# Patient Record
Sex: FEMALE | Race: WHITE | NOT HISPANIC OR LATINO | Employment: OTHER | URBAN - METROPOLITAN AREA
[De-identification: names, ages, dates, MRNs, and addresses within clinical notes are randomized per-mention and may not be internally consistent; named-entity substitution may affect disease eponyms.]

---

## 2018-01-31 ENCOUNTER — NEW PATIENT COMPREHENSIVE (OUTPATIENT)
Dept: URBAN - METROPOLITAN AREA CLINIC 43 | Facility: CLINIC | Age: 83
End: 2018-01-31

## 2018-01-31 DIAGNOSIS — H43.813: ICD-10-CM

## 2018-01-31 DIAGNOSIS — H04.123: ICD-10-CM

## 2018-01-31 DIAGNOSIS — H40.013: ICD-10-CM

## 2018-01-31 DIAGNOSIS — H25.13: ICD-10-CM

## 2018-01-31 PROCEDURE — 1036F TOBACCO NON-USER: CPT

## 2018-01-31 PROCEDURE — 92004 COMPRE OPH EXAM NEW PT 1/>: CPT

## 2018-01-31 PROCEDURE — G8427 DOCREV CUR MEDS BY ELIG CLIN: HCPCS

## 2018-01-31 PROCEDURE — 92015 DETERMINE REFRACTIVE STATE: CPT

## 2018-01-31 ASSESSMENT — TONOMETRY
OD_IOP_MMHG: 16
OS_IOP_MMHG: 17

## 2018-01-31 ASSESSMENT — VISUAL ACUITY
OS_CC: J1
OS_CC: 20/25-1
OS_SC: J1-
OD_BAT: 20/50
OD_CC: J1-
OS_SC: 20/50-2
OD_SC: 20/60
OS_BAT: 20/40
OD_SC: J2-
OD_CC: 20/30+2

## 2018-11-09 ENCOUNTER — EST. PATIENT EMERGENCY (OUTPATIENT)
Dept: URBAN - METROPOLITAN AREA CLINIC 47 | Facility: CLINIC | Age: 83
End: 2018-11-09

## 2018-11-09 DIAGNOSIS — H02.88B: ICD-10-CM

## 2018-11-09 DIAGNOSIS — H02.825: ICD-10-CM

## 2018-11-09 DIAGNOSIS — D23.122: ICD-10-CM

## 2018-11-09 PROCEDURE — 92012 INTRM OPH EXAM EST PATIENT: CPT

## 2018-11-09 PROCEDURE — G9903 PT SCRN TBCO ID AS NON USER: HCPCS

## 2018-11-09 PROCEDURE — 1036F TOBACCO NON-USER: CPT

## 2018-11-09 PROCEDURE — G8427 DOCREV CUR MEDS BY ELIG CLIN: HCPCS

## 2018-11-09 ASSESSMENT — VISUAL ACUITY
OS_CC: J1
OD_CC: 20/40-2
OS_CC: 20/30-2
OD_CC: J1

## 2018-11-09 ASSESSMENT — TONOMETRY: OS_IOP_MMHG: 16

## 2021-06-05 DIAGNOSIS — Z11.59 ENCOUNTER FOR SCREENING FOR OTHER VIRAL DISEASES: ICD-10-CM

## 2021-07-09 ENCOUNTER — TRANSFERRED RECORDS (OUTPATIENT)
Dept: HEALTH INFORMATION MANAGEMENT | Facility: CLINIC | Age: 86
End: 2021-07-09

## 2021-07-09 LAB
CREATININE (EXTERNAL): 1.35 MG/DL (ref 0.57–1.11)
GFR ESTIMATED (EXTERNAL): 37 ML/MIN/1.73M2
GFR ESTIMATED (IF AFRICAN AMERICAN) (EXTERNAL): 45 ML/MIN/1.73M2
GLUCOSE (EXTERNAL): 215 MG/DL (ref 65–100)
POTASSIUM (EXTERNAL): 4.4 MMOL/L (ref 3.5–5)

## 2021-07-12 RX ORDER — ROSUVASTATIN CALCIUM 10 MG/1
10 TABLET, COATED ORAL
COMMUNITY
Start: 2021-05-10

## 2021-07-12 RX ORDER — ALPRAZOLAM 0.25 MG
0.25 TABLET ORAL
COMMUNITY
Start: 2021-05-06

## 2021-07-12 RX ORDER — METRONIDAZOLE 500 MG/1
TABLET ORAL
Status: ON HOLD | COMMUNITY
Start: 2021-05-27 | End: 2021-07-15

## 2021-07-12 RX ORDER — ONDANSETRON 4 MG/1
4 TABLET, FILM COATED ORAL
Status: ON HOLD | COMMUNITY
Start: 2021-07-09 | End: 2021-07-15

## 2021-07-12 RX ORDER — ASPIRIN 325 MG
TABLET ORAL
COMMUNITY

## 2021-07-12 RX ORDER — HYDROCORTISONE 2.5 %
CREAM (GRAM) TOPICAL
Status: ON HOLD | COMMUNITY
Start: 2019-10-17 | End: 2021-07-14

## 2021-07-12 RX ORDER — GLIPIZIDE 5 MG/1
2.5 TABLET ORAL EVERY MORNING
COMMUNITY
Start: 2020-08-25

## 2021-07-12 RX ORDER — LATANOPROST 50 UG/ML
SOLUTION/ DROPS OPHTHALMIC
COMMUNITY
Start: 2021-06-22

## 2021-07-12 RX ORDER — LISINOPRIL 2.5 MG/1
2.5 TABLET ORAL
COMMUNITY
Start: 2021-06-03

## 2021-07-12 RX ORDER — TIMOLOL MALEATE 5 MG/ML
1 SOLUTION/ DROPS OPHTHALMIC 2 TIMES DAILY
COMMUNITY
Start: 2020-06-12

## 2021-07-12 RX ORDER — METFORMIN HCL 500 MG
1000 TABLET, EXTENDED RELEASE 24 HR ORAL 2 TIMES DAILY
COMMUNITY
Start: 2021-06-03

## 2021-07-12 RX ORDER — HYDROCHLOROTHIAZIDE 25 MG/1
12.5 TABLET ORAL DAILY
COMMUNITY
Start: 2021-06-03

## 2021-07-12 RX ORDER — NEOMYCIN SULFATE 500 MG/1
TABLET ORAL
Status: ON HOLD | COMMUNITY
Start: 2021-05-27 | End: 2021-07-15

## 2021-07-13 ENCOUNTER — HOSPITAL ENCOUNTER (OUTPATIENT)
Facility: CLINIC | Age: 86
Discharge: HOME | End: 2021-07-13
Attending: COLON & RECTAL SURGERY | Admitting: COLON & RECTAL SURGERY
Payer: MEDICARE

## 2021-07-13 VITALS
RESPIRATION RATE: 14 BRPM | HEART RATE: 54 BPM | TEMPERATURE: 96.7 F | OXYGEN SATURATION: 90 % | DIASTOLIC BLOOD PRESSURE: 52 MMHG | BODY MASS INDEX: 24.35 KG/M2 | WEIGHT: 116 LBS | HEIGHT: 58 IN | SYSTOLIC BLOOD PRESSURE: 112 MMHG

## 2021-07-13 LAB
COLONOSCOPY: NORMAL
GLUCOSE BLDC GLUCOMTR-MCNC: 155 MG/DL (ref 70–99)

## 2021-07-13 PROCEDURE — 99153 MOD SED SAME PHYS/QHP EA: CPT | Performed by: COLON & RECTAL SURGERY

## 2021-07-13 PROCEDURE — 45385 COLONOSCOPY W/LESION REMOVAL: CPT | Performed by: COLON & RECTAL SURGERY

## 2021-07-13 PROCEDURE — 250N000013 HC RX MED GY IP 250 OP 250 PS 637: Performed by: COLON & RECTAL SURGERY

## 2021-07-13 PROCEDURE — 88305 TISSUE EXAM BY PATHOLOGIST: CPT | Mod: TC | Performed by: COLON & RECTAL SURGERY

## 2021-07-13 PROCEDURE — 258N000003 HC RX IP 258 OP 636: Performed by: COLON & RECTAL SURGERY

## 2021-07-13 PROCEDURE — 45382 COLONOSCOPY W/CONTROL BLEED: CPT | Performed by: COLON & RECTAL SURGERY

## 2021-07-13 PROCEDURE — 45380 COLONOSCOPY AND BIOPSY: CPT | Mod: PT,XU | Performed by: COLON & RECTAL SURGERY

## 2021-07-13 PROCEDURE — 82962 GLUCOSE BLOOD TEST: CPT

## 2021-07-13 PROCEDURE — 250N000011 HC RX IP 250 OP 636: Performed by: COLON & RECTAL SURGERY

## 2021-07-13 PROCEDURE — G0500 MOD SEDAT ENDO SERVICE >5YRS: HCPCS | Performed by: COLON & RECTAL SURGERY

## 2021-07-13 RX ORDER — ONDANSETRON 2 MG/ML
4 INJECTION INTRAMUSCULAR; INTRAVENOUS
Status: DISCONTINUED | OUTPATIENT
Start: 2021-07-13 | End: 2021-07-13 | Stop reason: HOSPADM

## 2021-07-13 RX ORDER — ONDANSETRON 4 MG/1
4 TABLET, ORALLY DISINTEGRATING ORAL EVERY 6 HOURS PRN
Status: DISCONTINUED | OUTPATIENT
Start: 2021-07-13 | End: 2021-07-13 | Stop reason: HOSPADM

## 2021-07-13 RX ORDER — SODIUM CHLORIDE 9 MG/ML
INJECTION, SOLUTION INTRAVENOUS CONTINUOUS PRN
Status: COMPLETED | OUTPATIENT
Start: 2021-07-13 | End: 2021-07-13

## 2021-07-13 RX ORDER — PROCHLORPERAZINE MALEATE 5 MG
5 TABLET ORAL EVERY 6 HOURS PRN
Status: DISCONTINUED | OUTPATIENT
Start: 2021-07-13 | End: 2021-07-13 | Stop reason: HOSPADM

## 2021-07-13 RX ORDER — NALOXONE HYDROCHLORIDE 0.4 MG/ML
0.4 INJECTION, SOLUTION INTRAMUSCULAR; INTRAVENOUS; SUBCUTANEOUS
Status: DISCONTINUED | OUTPATIENT
Start: 2021-07-13 | End: 2021-07-13 | Stop reason: HOSPADM

## 2021-07-13 RX ORDER — SIMETHICONE 40MG/0.6ML
SUSPENSION, DROPS(FINAL DOSAGE FORM)(ML) ORAL PRN
Status: COMPLETED | OUTPATIENT
Start: 2021-07-13 | End: 2021-07-13

## 2021-07-13 RX ORDER — NALOXONE HYDROCHLORIDE 0.4 MG/ML
0.2 INJECTION, SOLUTION INTRAMUSCULAR; INTRAVENOUS; SUBCUTANEOUS
Status: DISCONTINUED | OUTPATIENT
Start: 2021-07-13 | End: 2021-07-13 | Stop reason: HOSPADM

## 2021-07-13 RX ORDER — LIDOCAINE 40 MG/G
CREAM TOPICAL
Status: DISCONTINUED | OUTPATIENT
Start: 2021-07-13 | End: 2021-07-13 | Stop reason: HOSPADM

## 2021-07-13 RX ORDER — ONDANSETRON 2 MG/ML
4 INJECTION INTRAMUSCULAR; INTRAVENOUS EVERY 6 HOURS PRN
Status: DISCONTINUED | OUTPATIENT
Start: 2021-07-13 | End: 2021-07-13 | Stop reason: HOSPADM

## 2021-07-13 RX ORDER — FLUMAZENIL 0.1 MG/ML
0.2 INJECTION, SOLUTION INTRAVENOUS
Status: DISCONTINUED | OUTPATIENT
Start: 2021-07-13 | End: 2021-07-13 | Stop reason: HOSPADM

## 2021-07-13 RX ORDER — FENTANYL CITRATE 50 UG/ML
INJECTION, SOLUTION INTRAMUSCULAR; INTRAVENOUS PRN
Status: COMPLETED | OUTPATIENT
Start: 2021-07-13 | End: 2021-07-13

## 2021-07-13 RX ADMIN — SODIUM CHLORIDE 125 ML/HR: 9 INJECTION, SOLUTION INTRAVENOUS at 11:17

## 2021-07-13 RX ADMIN — MIDAZOLAM 1 MG: 1 INJECTION INTRAMUSCULAR; INTRAVENOUS at 10:49

## 2021-07-13 RX ADMIN — Medication 133 MG: at 11:03

## 2021-07-13 RX ADMIN — FENTANYL CITRATE 25 MCG: 50 INJECTION, SOLUTION INTRAMUSCULAR; INTRAVENOUS at 11:01

## 2021-07-13 RX ADMIN — FENTANYL CITRATE 50 MCG: 50 INJECTION, SOLUTION INTRAMUSCULAR; INTRAVENOUS at 10:49

## 2021-07-13 ASSESSMENT — MIFFLIN-ST. JEOR
SCORE: 845.92
SCORE: 845.92

## 2021-07-13 NOTE — DISCHARGE INSTRUCTIONS
The patient has received a copy of the Provation  report the doctor has written and discharge instructions have been discussed with the patient and responsible adult.  All questions were addressed and answered prior to patient discharge.      Understanding Colon and Rectal Polyps     The colon has a smooth lining composed of millions of cells.     The colon (also called the large intestine) is a muscular tube that forms the last part of the digestive tract. It absorbs water and stores food waste. The colon is about 4 to 6 feet long. The rectum is the last 6 inches of the colon. The colon and rectum have a smooth lining composed of millions of cells. Changes in these cells can lead to growths in the colon that can become cancerous and should be removed.     When the Colon Lining Changes  Changes that occur in the cells that line the colon or rectum can lead to growths called polyps. Over a period of years, polyps can turn cancerous. Removing polyps early may prevent cancer from ever forming.      Polyps  Polyps are fleshy clumps of tissue that form on the lining of the colon or rectum. Small polyps are usually benign (not cancerous). However, over time, cells in a polyp can change and become cancerous. The larger a polyp grows, the more likely this is to happen. Also, certain types of polyps known as adenomatous polyps are considered premalignant. This means that they will almost always become cancerous if they re not removed.          Cancer  Almost all colorectal cancers start when polyp cells begin growing abnormally. As a cancerous tumor grows, it may involve more and more of the colon or rectum. In time, cancer can also grow beyond the colon or rectum and spread to nearby organs or to glands called lymph nodes. The cells can also travel to other parts of the body. This is known as metastasis. The earlier a cancerous tumor is removed, the better the chance of preventing its spread.        7514-8093 Wayne  [employer], [address] [place] [address]. All rights reserved. This information is not intended as a substitute for professional medical care. Always follow your healthcare professional's instructions.                                Understanding Diverticulosis and Diverticulitis     Pouches or diverticula usually occur in the lower part of the colon called the sigmoid.      Diverticulitis occurs when the pouches become inflamed.     The colon (large intestine) is the last part of the digestive tract. It absorbs water from stool and changes it from a liquid to a solid. In certain cases, small pouches called diverticula can form in the colon wall. This condition is called diverticulosis. The pouches can become infected. If this happens, it becomes a more serious problem called diverticulitis. These problems can be painful. But they can be managed.   Managing Your Condition  Diet changes or taking medications are often tried first. These may be enough to bring relief. If the case is bad, surgery may be done. You and your doctor can discuss the plan that is best for you.  If You Have Diverticulosis  Diet changes are often enough to control symptoms. The main changes are adding fiber (roughage) and drinking more water. Fiber absorbs water as it travels through your colon. This helps your stool stay soft and move smoothly. Water helps this process. If needed, you may be told to take over-the-counter stool softeners. To help relieve pain, antispasmodic medications may be prescribed.  If You Have Diverticulitis  Treatment depends on how bad your symptoms are.  For mild symptoms: You may be put on a liquid diet for a short time. You may also be prescribed antibiotics. If these two steps relieve your symptoms, you may then be prescribed a high-fiber diet. If you still have symptoms, your doctor will discuss further treatment options with you.  For severe symptoms: You may need to be admitted to the hospital.  There, you can be given IV antibiotics and fluids. Once symptoms are under control, the above treatments may be tried. If these don t control your condition, your doctor may discuss the option of having surgery with you.  [place] to Colon Health  Help keep your colon healthy with a diet that includes plenty of high-fiber fruits, vegetables, and whole grains. Drink plenty of liquids like water and juice. Your doctor may also recommend avoiding seeds and nuts.          4984-0609 [facility], [address] [place] [address], [place] [address]. All rights reserved. This information is not intended as a substitute for professional medical care. Always follow your healthcare professional's instructions.      Eating a High-Fiber Diet  Fiber is what gives strength and structure to plants. Most grains, beans, vegetables, and fruits contain fiber. Foods rich in fiber are often low in calories and fat, and they fill you up more. They may also reduce your risks for certain health problems. To find out the amount of fiber in canned, packaged, or frozen foods, read the  Nutrition Facts  label. It tells you how much fiber is in a serving.      Types of Fiber and Their Benefits  There are two types of fiber: insoluble and soluble. They both aid digestion and help you maintain a healthy weight.  Insoluble fiber: This is found in whole grains, cereals, certain fruits and vegetables (such as apple skin, corn, and carrots). Insoluble fiber may prevent constipation and reduce the risk of certain types of cancer.   Soluble fiber: This type of fiber is in oats, beans, and certain fruits and vegetables (such as strawberries and peas). Soluble fiber can reduce cholesterol (which may help lower the risk of heart disease), and helps control blood sugar levels.  Look for High-Fiber Foods  Whole-grain breads and cereals: Try to eat 6-8 ounces a day. Include wheat and oat bran cereals, whole-wheat muffins or toast, and corn tortillas in your  meals.  Fruits: Try to eat 2 cups a day. Apples, oranges, strawberries, pears, and bananas are good sources. (Note: Fruit juice is low in fiber.)  Vegetables: Try to eat 3 cups a day. Add asparagus, carrots, broccoli, peas, and corn to your meals.  Legumes (beans): One cup of cooked lentils gives you over 15 grams of fiber. Try navy beans, lentils, and chickpeas.  Seeds:  A small handful of seeds gives you about 3 grams of fiber. Try sunflower seeds.    Keep Track of Your Fiber  A healthy diet includes 31 grams of fiber a day if you have a 2,000-calorie diet. Keep track of how much fiber you eat. Start by reading food labels. Then eat a variety of foods high in fiber. Ask your doctor about supplemental fiber products.            1632-3026 Wayne Feldman[address], [place] [address]. All rights reserved. This information is not intended as a substitute for professional medical care. Always follow your healthcare professional's instructions.

## 2021-07-13 NOTE — H&P
"Pre-Endoscopy History and Physical     Zoe House MRN# [id number]   YOB: 1933 Age: 88 year old     Date of Procedure: 7/13/2021  Primary care provider: No Ref-Primary, Physician  Type of Endoscopy: Colonoscopy  Reason for Procedure: Screening, fecal incontinence  Type of Anesthesia Anticipated: Moderate Sedation    HPI:    Zoe is a 88 year old female who will be undergoing the above procedure.      A history and physical has been performed. The patient's medications and allergies have been reviewed. The risks and benefits of the procedure and the sedation options and risks were discussed with the patient.  All questions were answered and informed consent was obtained.      She denies a personal or family history of anesthesia complications or bleeding disorders.     Allergies   Allergen Reactions     Sulfa Drugs         No current facility-administered medications on file prior to encounter.  No current outpatient medications on file prior to encounter.      There is no problem list on file for this patient.       Past Medical History:   Diagnosis Date     Diabetes (H)      High cholesterol      Hypertension      Rectal prolapse         Past Surgical History:   Procedure Laterality Date     GYN SURGERY  2000    hysterectomy       Social History     Tobacco Use     Smoking status: Never Smoker     Smokeless tobacco: Never Used   Substance Use Topics     Alcohol use: Never       Family History   Problem Relation Age of Onset     Colon Cancer No family hx of        REVIEW OF SYSTEMS:     5 point ROS negative except as noted above in HPI, including Gen., Resp., CV, GI &  system review.      PHYSICAL EXAM:   Ht 1.473 m (4' 10\")   Wt 52.6 kg (116 lb)   BMI 24.24 kg/m   Estimated body mass index is 24.24 kg/m  as calculated from the following:    Height as of this encounter: 1.473 m (4' 10\").    Weight as of this encounter: 52.6 kg (116 lb).   GENERAL APPEARANCE: healthy and alert  MENTAL " STATUS: alert  AIRWAY EXAM: Mallampatti Class I (visualization of the soft palate, fauces, uvula, anterior and posterior pillars)  RESP: lungs clear to auscultation - no rales, rhonchi or wheezes  CV: regular rates and rhythm      IMPRESSION   ASA Class 3 - Severe systemic disease, but not incapacitating        PLAN:     Plan for colonoscopy. We discussed the risks, benefits and alternatives and the patient wished to proceed.    The above has been forwarded to the consulting provider.      Ashley Andino MD  [facility]  Phone: [phone]  Fax: [phone]  July 13, 2021

## 2021-07-14 ENCOUNTER — ANESTHESIA (OUTPATIENT)
Dept: SURGERY | Facility: CLINIC | Age: 86
DRG: 348 | End: 2021-07-14
Payer: MEDICARE

## 2021-07-14 ENCOUNTER — HOSPITAL ENCOUNTER (INPATIENT)
Facility: CLINIC | Age: 86
LOS: 2 days | Discharge: HOME | DRG: 348 | End: 2021-07-16
Attending: COLON & RECTAL SURGERY | Admitting: COLON & RECTAL SURGERY
Payer: MEDICARE

## 2021-07-14 ENCOUNTER — ANESTHESIA EVENT (OUTPATIENT)
Dept: SURGERY | Facility: CLINIC | Age: 86
DRG: 348 | End: 2021-07-14
Payer: MEDICARE

## 2021-07-14 PROBLEM — K62.3 RECTAL PROLAPSE: Status: ACTIVE | Noted: 2021-07-14

## 2021-07-14 LAB
FASTING STATUS PATIENT QL REPORTED: YES
GLUCOSE BLD-MCNC: 129 MG/DL (ref 70–99)
GLUCOSE BLDC GLUCOMTR-MCNC: 151 MG/DL (ref 70–99)
GLUCOSE BLDC GLUCOMTR-MCNC: 162 MG/DL (ref 70–99)
PATH REPORT.COMMENTS IMP SPEC: NORMAL
PATH REPORT.COMMENTS IMP SPEC: NORMAL
PATH REPORT.FINAL DX SPEC: NORMAL
PATH REPORT.GROSS SPEC: NORMAL
PATH REPORT.MICROSCOPIC SPEC OTHER STN: NORMAL
PHOTO IMAGE: NORMAL
PLATELET # BLD AUTO: 342 10E3/UL (ref 150–450)
POTASSIUM BLD-SCNC: 3.1 MMOL/L (ref 3.4–5.3)

## 2021-07-14 PROCEDURE — 250N000013 HC RX MED GY IP 250 OP 250 PS 637: Performed by: COLON & RECTAL SURGERY

## 2021-07-14 PROCEDURE — 250N000009 HC RX 250: Performed by: COLON & RECTAL SURGERY

## 2021-07-14 PROCEDURE — 250N000025 HC SEVOFLURANE, PER MIN: Performed by: COLON & RECTAL SURGERY

## 2021-07-14 PROCEDURE — 250N000011 HC RX IP 250 OP 636: Performed by: ANESTHESIOLOGY

## 2021-07-14 PROCEDURE — 99222 1ST HOSP IP/OBS MODERATE 55: CPT | Performed by: PHYSICIAN ASSISTANT

## 2021-07-14 PROCEDURE — 258N000003 HC RX IP 258 OP 636: Performed by: ANESTHESIOLOGY

## 2021-07-14 PROCEDURE — 250N000009 HC RX 250

## 2021-07-14 PROCEDURE — 250N000011 HC RX IP 250 OP 636: Performed by: COLON & RECTAL SURGERY

## 2021-07-14 PROCEDURE — 36415 COLL VENOUS BLD VENIPUNCTURE: CPT | Performed by: COLON & RECTAL SURGERY

## 2021-07-14 PROCEDURE — 93005 ELECTROCARDIOGRAM TRACING: CPT

## 2021-07-14 PROCEDURE — 272N000001 HC OR GENERAL SUPPLY STERILE: Performed by: COLON & RECTAL SURGERY

## 2021-07-14 PROCEDURE — 82947 ASSAY GLUCOSE BLOOD QUANT: CPT | Performed by: ANESTHESIOLOGY

## 2021-07-14 PROCEDURE — 88307 TISSUE EXAM BY PATHOLOGIST: CPT | Mod: TC | Performed by: COLON & RECTAL SURGERY

## 2021-07-14 PROCEDURE — 258N000003 HC RX IP 258 OP 636: Performed by: COLON & RECTAL SURGERY

## 2021-07-14 PROCEDURE — 250N000011 HC RX IP 250 OP 636

## 2021-07-14 PROCEDURE — 0DBP7ZZ EXCISION OF RECTUM, VIA NATURAL OR ARTIFICIAL OPENING: ICD-10-PCS | Performed by: COLON & RECTAL SURGERY

## 2021-07-14 PROCEDURE — 82962 GLUCOSE BLOOD TEST: CPT

## 2021-07-14 PROCEDURE — 84132 ASSAY OF SERUM POTASSIUM: CPT | Performed by: ANESTHESIOLOGY

## 2021-07-14 PROCEDURE — 36415 COLL VENOUS BLD VENIPUNCTURE: CPT | Performed by: ANESTHESIOLOGY

## 2021-07-14 PROCEDURE — 710N000009 HC RECOVERY PHASE 1, LEVEL 1, PER MIN: Performed by: COLON & RECTAL SURGERY

## 2021-07-14 PROCEDURE — 120N000001 HC R&B MED SURG/OB

## 2021-07-14 PROCEDURE — 99207 PR CONSULT E&M CHANGED TO INITIAL LEVEL: CPT | Performed by: PHYSICIAN ASSISTANT

## 2021-07-14 PROCEDURE — 0DBN7ZZ EXCISION OF SIGMOID COLON, VIA NATURAL OR ARTIFICIAL OPENING: ICD-10-PCS | Performed by: COLON & RECTAL SURGERY

## 2021-07-14 PROCEDURE — 360N000077 HC SURGERY LEVEL 4, PER MIN: Performed by: COLON & RECTAL SURGERY

## 2021-07-14 PROCEDURE — 999N000141 HC STATISTIC PRE-PROCEDURE NURSING ASSESSMENT: Performed by: COLON & RECTAL SURGERY

## 2021-07-14 PROCEDURE — 370N000017 HC ANESTHESIA TECHNICAL FEE, PER MIN: Performed by: COLON & RECTAL SURGERY

## 2021-07-14 PROCEDURE — 85049 AUTOMATED PLATELET COUNT: CPT | Performed by: COLON & RECTAL SURGERY

## 2021-07-14 PROCEDURE — 88305 TISSUE EXAM BY PATHOLOGIST: CPT | Mod: 26 | Performed by: PATHOLOGY

## 2021-07-14 RX ORDER — NALOXONE HYDROCHLORIDE 0.4 MG/ML
0.4 INJECTION, SOLUTION INTRAMUSCULAR; INTRAVENOUS; SUBCUTANEOUS
Status: DISCONTINUED | OUTPATIENT
Start: 2021-07-14 | End: 2021-07-16 | Stop reason: HOSPADM

## 2021-07-14 RX ORDER — HEPARIN SODIUM 5000 [USP'U]/.5ML
5000 INJECTION, SOLUTION INTRAVENOUS; SUBCUTANEOUS EVERY 8 HOURS
Status: DISCONTINUED | OUTPATIENT
Start: 2021-07-15 | End: 2021-07-16 | Stop reason: HOSPADM

## 2021-07-14 RX ORDER — ONDANSETRON 2 MG/ML
4 INJECTION INTRAMUSCULAR; INTRAVENOUS EVERY 30 MIN PRN
Status: DISCONTINUED | OUTPATIENT
Start: 2021-07-14 | End: 2021-07-14 | Stop reason: HOSPADM

## 2021-07-14 RX ORDER — HYDROMORPHONE HCL IN WATER/PF 6 MG/30 ML
.2-.4 PATIENT CONTROLLED ANALGESIA SYRINGE INTRAVENOUS EVERY 5 MIN PRN
Status: DISCONTINUED | OUTPATIENT
Start: 2021-07-14 | End: 2021-07-14 | Stop reason: HOSPADM

## 2021-07-14 RX ORDER — HYDROMORPHONE HCL IN WATER/PF 6 MG/30 ML
0.4 PATIENT CONTROLLED ANALGESIA SYRINGE INTRAVENOUS
Status: DISCONTINUED | OUTPATIENT
Start: 2021-07-14 | End: 2021-07-16 | Stop reason: HOSPADM

## 2021-07-14 RX ORDER — FENTANYL CITRATE 50 UG/ML
50 INJECTION, SOLUTION INTRAMUSCULAR; INTRAVENOUS EVERY 5 MIN PRN
Status: DISCONTINUED | OUTPATIENT
Start: 2021-07-14 | End: 2021-07-14 | Stop reason: HOSPADM

## 2021-07-14 RX ORDER — CEFADROXIL 500 MG/1
500 CAPSULE ORAL 2 TIMES DAILY
Status: DISCONTINUED | OUTPATIENT
Start: 2021-07-14 | End: 2021-07-14

## 2021-07-14 RX ORDER — SODIUM CHLORIDE, SODIUM LACTATE, POTASSIUM CHLORIDE, CALCIUM CHLORIDE 600; 310; 30; 20 MG/100ML; MG/100ML; MG/100ML; MG/100ML
INJECTION, SOLUTION INTRAVENOUS CONTINUOUS
Status: DISCONTINUED | OUTPATIENT
Start: 2021-07-14 | End: 2021-07-14 | Stop reason: HOSPADM

## 2021-07-14 RX ORDER — DEXAMETHASONE SODIUM PHOSPHATE 4 MG/ML
INJECTION, SOLUTION INTRA-ARTICULAR; INTRALESIONAL; INTRAMUSCULAR; INTRAVENOUS; SOFT TISSUE PRN
Status: DISCONTINUED | OUTPATIENT
Start: 2021-07-14 | End: 2021-07-14

## 2021-07-14 RX ORDER — CEFAZOLIN SODIUM 2 G/100ML
2 INJECTION, SOLUTION INTRAVENOUS SEE ADMIN INSTRUCTIONS
Status: DISCONTINUED | OUTPATIENT
Start: 2021-07-14 | End: 2021-07-14 | Stop reason: HOSPADM

## 2021-07-14 RX ORDER — LIDOCAINE HYDROCHLORIDE 20 MG/ML
INJECTION, SOLUTION INFILTRATION; PERINEURAL PRN
Status: DISCONTINUED | OUTPATIENT
Start: 2021-07-14 | End: 2021-07-14

## 2021-07-14 RX ORDER — NICOTINE POLACRILEX 4 MG
15-30 LOZENGE BUCCAL
Status: DISCONTINUED | OUTPATIENT
Start: 2021-07-14 | End: 2021-07-16 | Stop reason: HOSPADM

## 2021-07-14 RX ORDER — CEFAZOLIN SODIUM 1 G/3ML
1 INJECTION, POWDER, FOR SOLUTION INTRAMUSCULAR; INTRAVENOUS EVERY 8 HOURS
Status: COMPLETED | OUTPATIENT
Start: 2021-07-14 | End: 2021-07-15

## 2021-07-14 RX ORDER — POTASSIUM CHLORIDE 7.45 MG/ML
10 INJECTION INTRAVENOUS ONCE
Status: COMPLETED | OUTPATIENT
Start: 2021-07-14 | End: 2021-07-14

## 2021-07-14 RX ORDER — DEXTROSE MONOHYDRATE 25 G/50ML
25-50 INJECTION, SOLUTION INTRAVENOUS
Status: DISCONTINUED | OUTPATIENT
Start: 2021-07-14 | End: 2021-07-16 | Stop reason: HOSPADM

## 2021-07-14 RX ORDER — ONDANSETRON 2 MG/ML
4 INJECTION INTRAMUSCULAR; INTRAVENOUS ONCE
Status: COMPLETED | OUTPATIENT
Start: 2021-07-14 | End: 2021-07-14

## 2021-07-14 RX ORDER — LIDOCAINE 40 MG/G
CREAM TOPICAL
Status: DISCONTINUED | OUTPATIENT
Start: 2021-07-14 | End: 2021-07-16 | Stop reason: HOSPADM

## 2021-07-14 RX ORDER — ONDANSETRON 2 MG/ML
INJECTION INTRAMUSCULAR; INTRAVENOUS PRN
Status: DISCONTINUED | OUTPATIENT
Start: 2021-07-14 | End: 2021-07-14

## 2021-07-14 RX ORDER — CEFADROXIL 500 MG/1
500 CAPSULE ORAL 2 TIMES DAILY
Status: COMPLETED | OUTPATIENT
Start: 2021-07-14 | End: 2021-07-16

## 2021-07-14 RX ORDER — PROPOFOL 10 MG/ML
INJECTION, EMULSION INTRAVENOUS PRN
Status: DISCONTINUED | OUTPATIENT
Start: 2021-07-14 | End: 2021-07-14

## 2021-07-14 RX ORDER — LABETALOL HYDROCHLORIDE 5 MG/ML
10 INJECTION, SOLUTION INTRAVENOUS EVERY 5 MIN PRN
Status: DISCONTINUED | OUTPATIENT
Start: 2021-07-14 | End: 2021-07-14 | Stop reason: HOSPADM

## 2021-07-14 RX ORDER — NALOXONE HYDROCHLORIDE 0.4 MG/ML
0.2 INJECTION, SOLUTION INTRAMUSCULAR; INTRAVENOUS; SUBCUTANEOUS
Status: DISCONTINUED | OUTPATIENT
Start: 2021-07-14 | End: 2021-07-16 | Stop reason: HOSPADM

## 2021-07-14 RX ORDER — ACETAMINOPHEN 325 MG/1
650 TABLET ORAL EVERY 4 HOURS PRN
Status: DISCONTINUED | OUTPATIENT
Start: 2021-07-17 | End: 2021-07-16 | Stop reason: HOSPADM

## 2021-07-14 RX ORDER — ACETAMINOPHEN 325 MG/1
975 TABLET ORAL EVERY 8 HOURS
Status: DISCONTINUED | OUTPATIENT
Start: 2021-07-14 | End: 2021-07-16 | Stop reason: HOSPADM

## 2021-07-14 RX ORDER — OXYCODONE HYDROCHLORIDE 5 MG/1
5 TABLET ORAL EVERY 4 HOURS PRN
Status: DISCONTINUED | OUTPATIENT
Start: 2021-07-14 | End: 2021-07-16 | Stop reason: HOSPADM

## 2021-07-14 RX ORDER — LISINOPRIL 2.5 MG/1
2.5 TABLET ORAL DAILY
Status: DISCONTINUED | OUTPATIENT
Start: 2021-07-15 | End: 2021-07-16 | Stop reason: HOSPADM

## 2021-07-14 RX ORDER — ONDANSETRON 4 MG/1
4 TABLET, ORALLY DISINTEGRATING ORAL EVERY 6 HOURS PRN
Status: DISCONTINUED | OUTPATIENT
Start: 2021-07-14 | End: 2021-07-16 | Stop reason: HOSPADM

## 2021-07-14 RX ORDER — HYDRALAZINE HYDROCHLORIDE 20 MG/ML
2.5-5 INJECTION INTRAMUSCULAR; INTRAVENOUS
Status: DISCONTINUED | OUTPATIENT
Start: 2021-07-14 | End: 2021-07-14 | Stop reason: HOSPADM

## 2021-07-14 RX ORDER — HYDRALAZINE HYDROCHLORIDE 20 MG/ML
10 INJECTION INTRAMUSCULAR; INTRAVENOUS EVERY 4 HOURS PRN
Status: DISCONTINUED | OUTPATIENT
Start: 2021-07-14 | End: 2021-07-16 | Stop reason: HOSPADM

## 2021-07-14 RX ORDER — NEOSTIGMINE METHYLSULFATE 1 MG/ML
VIAL (ML) INJECTION PRN
Status: DISCONTINUED | OUTPATIENT
Start: 2021-07-14 | End: 2021-07-14

## 2021-07-14 RX ORDER — CEFAZOLIN SODIUM 2 G/100ML
2 INJECTION, SOLUTION INTRAVENOUS
Status: COMPLETED | OUTPATIENT
Start: 2021-07-14 | End: 2021-07-14

## 2021-07-14 RX ORDER — BUPIVACAINE HYDROCHLORIDE AND EPINEPHRINE 5; 5 MG/ML; UG/ML
INJECTION, SOLUTION EPIDURAL; INTRACAUDAL; PERINEURAL
Status: DISCONTINUED
Start: 2021-07-14 | End: 2021-07-14 | Stop reason: HOSPADM

## 2021-07-14 RX ORDER — HEPARIN SODIUM 5000 [USP'U]/.5ML
5000 INJECTION, SOLUTION INTRAVENOUS; SUBCUTANEOUS
Status: COMPLETED | OUTPATIENT
Start: 2021-07-14 | End: 2021-07-14

## 2021-07-14 RX ORDER — DIPHENHYDRAMINE HYDROCHLORIDE 50 MG/ML
12.5 INJECTION INTRAMUSCULAR; INTRAVENOUS EVERY 6 HOURS PRN
Status: DISCONTINUED | OUTPATIENT
Start: 2021-07-14 | End: 2021-07-16 | Stop reason: HOSPADM

## 2021-07-14 RX ORDER — BUPIVACAINE HYDROCHLORIDE AND EPINEPHRINE 5; 5 MG/ML; UG/ML
INJECTION, SOLUTION PERINEURAL PRN
Status: DISCONTINUED | OUTPATIENT
Start: 2021-07-14 | End: 2021-07-14 | Stop reason: HOSPADM

## 2021-07-14 RX ORDER — EPHEDRINE SULFATE 50 MG/ML
INJECTION, SOLUTION INTRAMUSCULAR; INTRAVENOUS; SUBCUTANEOUS PRN
Status: DISCONTINUED | OUTPATIENT
Start: 2021-07-14 | End: 2021-07-14

## 2021-07-14 RX ORDER — PROPOFOL 10 MG/ML
INJECTION, EMULSION INTRAVENOUS CONTINUOUS PRN
Status: DISCONTINUED | OUTPATIENT
Start: 2021-07-14 | End: 2021-07-14

## 2021-07-14 RX ORDER — ONDANSETRON 4 MG/1
4 TABLET, ORALLY DISINTEGRATING ORAL EVERY 30 MIN PRN
Status: DISCONTINUED | OUTPATIENT
Start: 2021-07-14 | End: 2021-07-14 | Stop reason: HOSPADM

## 2021-07-14 RX ORDER — SODIUM CHLORIDE, SODIUM LACTATE, POTASSIUM CHLORIDE, CALCIUM CHLORIDE 600; 310; 30; 20 MG/100ML; MG/100ML; MG/100ML; MG/100ML
INJECTION, SOLUTION INTRAVENOUS CONTINUOUS
Status: DISCONTINUED | OUTPATIENT
Start: 2021-07-14 | End: 2021-07-15

## 2021-07-14 RX ORDER — ONDANSETRON 2 MG/ML
4 INJECTION INTRAMUSCULAR; INTRAVENOUS EVERY 6 HOURS PRN
Status: DISCONTINUED | OUTPATIENT
Start: 2021-07-14 | End: 2021-07-16 | Stop reason: HOSPADM

## 2021-07-14 RX ORDER — HYDROMORPHONE HCL IN WATER/PF 6 MG/30 ML
0.2 PATIENT CONTROLLED ANALGESIA SYRINGE INTRAVENOUS
Status: DISCONTINUED | OUTPATIENT
Start: 2021-07-14 | End: 2021-07-16 | Stop reason: HOSPADM

## 2021-07-14 RX ORDER — ACETAMINOPHEN 325 MG/1
975 TABLET ORAL ONCE
Status: COMPLETED | OUTPATIENT
Start: 2021-07-14 | End: 2021-07-14

## 2021-07-14 RX ORDER — GLYCOPYRROLATE 0.2 MG/ML
INJECTION, SOLUTION INTRAMUSCULAR; INTRAVENOUS PRN
Status: DISCONTINUED | OUTPATIENT
Start: 2021-07-14 | End: 2021-07-14

## 2021-07-14 RX ORDER — OXYCODONE HYDROCHLORIDE 5 MG/1
10 TABLET ORAL EVERY 4 HOURS PRN
Status: DISCONTINUED | OUTPATIENT
Start: 2021-07-14 | End: 2021-07-16 | Stop reason: HOSPADM

## 2021-07-14 RX ORDER — ROSUVASTATIN CALCIUM 10 MG/1
10 TABLET, COATED ORAL DAILY
Status: DISCONTINUED | OUTPATIENT
Start: 2021-07-14 | End: 2021-07-16 | Stop reason: HOSPADM

## 2021-07-14 RX ORDER — LATANOPROST 50 UG/ML
1 SOLUTION/ DROPS OPHTHALMIC EVERY EVENING
Status: DISCONTINUED | OUTPATIENT
Start: 2021-07-15 | End: 2021-07-16 | Stop reason: HOSPADM

## 2021-07-14 RX ORDER — FENTANYL CITRATE 50 UG/ML
INJECTION, SOLUTION INTRAMUSCULAR; INTRAVENOUS PRN
Status: DISCONTINUED | OUTPATIENT
Start: 2021-07-14 | End: 2021-07-14

## 2021-07-14 RX ORDER — TIMOLOL MALEATE 5 MG/ML
1 SOLUTION/ DROPS OPHTHALMIC DAILY
Status: DISCONTINUED | OUTPATIENT
Start: 2021-07-15 | End: 2021-07-15

## 2021-07-14 RX ORDER — HYDROCHLOROTHIAZIDE 12.5 MG/1
12.5 TABLET ORAL DAILY
Status: DISCONTINUED | OUTPATIENT
Start: 2021-07-15 | End: 2021-07-16 | Stop reason: HOSPADM

## 2021-07-14 RX ADMIN — FENTANYL CITRATE 25 MCG: 50 INJECTION, SOLUTION INTRAMUSCULAR; INTRAVENOUS at 09:52

## 2021-07-14 RX ADMIN — DEXAMETHASONE SODIUM PHOSPHATE 4 MG: 4 INJECTION, SOLUTION INTRA-ARTICULAR; INTRALESIONAL; INTRAMUSCULAR; INTRAVENOUS; SOFT TISSUE at 08:15

## 2021-07-14 RX ADMIN — Medication 5 MG: at 07:36

## 2021-07-14 RX ADMIN — ROCURONIUM BROMIDE 20 MG: 10 INJECTION INTRAVENOUS at 08:27

## 2021-07-14 RX ADMIN — POTASSIUM CHLORIDE 10 MEQ: 7.46 INJECTION, SOLUTION INTRAVENOUS at 07:04

## 2021-07-14 RX ADMIN — CEFADROXIL 500 MG: 500 CAPSULE ORAL at 21:22

## 2021-07-14 RX ADMIN — FENTANYL CITRATE 25 MCG: 50 INJECTION, SOLUTION INTRAMUSCULAR; INTRAVENOUS at 07:34

## 2021-07-14 RX ADMIN — GLYCOPYRROLATE 0.8 MG: 0.2 INJECTION, SOLUTION INTRAMUSCULAR; INTRAVENOUS at 09:54

## 2021-07-14 RX ADMIN — ACETAMINOPHEN 975 MG: 325 TABLET, FILM COATED ORAL at 22:06

## 2021-07-14 RX ADMIN — PROPOFOL 100 MG: 10 INJECTION, EMULSION INTRAVENOUS at 07:35

## 2021-07-14 RX ADMIN — ACETAMINOPHEN 975 MG: 325 TABLET, FILM COATED ORAL at 06:22

## 2021-07-14 RX ADMIN — GLYCOPYRROLATE 0.2 MG: 0.2 INJECTION, SOLUTION INTRAMUSCULAR; INTRAVENOUS at 07:29

## 2021-07-14 RX ADMIN — NEOSTIGMINE METHYLSULFATE 3 MG: 1 INJECTION, SOLUTION INTRAVENOUS at 09:54

## 2021-07-14 RX ADMIN — HEPARIN SODIUM 5000 UNITS: 10000 INJECTION, SOLUTION INTRAVENOUS; SUBCUTANEOUS at 08:10

## 2021-07-14 RX ADMIN — ROSUVASTATIN CALCIUM 10 MG: 10 TABLET, FILM COATED ORAL at 20:19

## 2021-07-14 RX ADMIN — PROPOFOL 40 MCG/KG/MIN: 10 INJECTION, EMULSION INTRAVENOUS at 07:42

## 2021-07-14 RX ADMIN — SODIUM CHLORIDE, POTASSIUM CHLORIDE, SODIUM LACTATE AND CALCIUM CHLORIDE: 600; 310; 30; 20 INJECTION, SOLUTION INTRAVENOUS at 16:15

## 2021-07-14 RX ADMIN — ROCURONIUM BROMIDE 30 MG: 10 INJECTION INTRAVENOUS at 07:35

## 2021-07-14 RX ADMIN — SODIUM CHLORIDE, POTASSIUM CHLORIDE, SODIUM LACTATE AND CALCIUM CHLORIDE: 600; 310; 30; 20 INJECTION, SOLUTION INTRAVENOUS at 06:31

## 2021-07-14 RX ADMIN — GLYCOPYRROLATE 0.2 MG: 0.2 INJECTION, SOLUTION INTRAMUSCULAR; INTRAVENOUS at 07:39

## 2021-07-14 RX ADMIN — FENTANYL CITRATE 25 MCG: 50 INJECTION, SOLUTION INTRAMUSCULAR; INTRAVENOUS at 09:09

## 2021-07-14 RX ADMIN — METRONIDAZOLE 500 MG: 500 INJECTION, SOLUTION INTRAVENOUS at 18:59

## 2021-07-14 RX ADMIN — METRONIDAZOLE 500 MG: 500 INJECTION, SOLUTION INTRAVENOUS at 07:58

## 2021-07-14 RX ADMIN — SODIUM CHLORIDE, POTASSIUM CHLORIDE, SODIUM LACTATE AND CALCIUM CHLORIDE: 600; 310; 30; 20 INJECTION, SOLUTION INTRAVENOUS at 09:52

## 2021-07-14 RX ADMIN — CEFAZOLIN 1 G: 1 INJECTION, POWDER, FOR SOLUTION INTRAMUSCULAR; INTRAVENOUS at 23:18

## 2021-07-14 RX ADMIN — ONDANSETRON 4 MG: 2 INJECTION INTRAMUSCULAR; INTRAVENOUS at 10:02

## 2021-07-14 RX ADMIN — SODIUM CHLORIDE, POTASSIUM CHLORIDE, SODIUM LACTATE AND CALCIUM CHLORIDE: 600; 310; 30; 20 INJECTION, SOLUTION INTRAVENOUS at 23:17

## 2021-07-14 RX ADMIN — Medication 5 MG: at 08:40

## 2021-07-14 RX ADMIN — CEFAZOLIN 1 G: 1 INJECTION, POWDER, FOR SOLUTION INTRAMUSCULAR; INTRAVENOUS at 16:16

## 2021-07-14 RX ADMIN — LIDOCAINE HYDROCHLORIDE 60 MG: 20 INJECTION, SOLUTION INFILTRATION; PERINEURAL at 07:34

## 2021-07-14 RX ADMIN — ACETAMINOPHEN 975 MG: 325 TABLET, FILM COATED ORAL at 16:16

## 2021-07-14 RX ADMIN — ONDANSETRON 4 MG: 2 INJECTION INTRAMUSCULAR; INTRAVENOUS at 07:21

## 2021-07-14 RX ADMIN — Medication 5 MG: at 08:10

## 2021-07-14 RX ADMIN — CEFAZOLIN SODIUM 2 G: 2 INJECTION, SOLUTION INTRAVENOUS at 07:45

## 2021-07-14 RX ADMIN — PROPOFOL 40 MG: 10 INJECTION, EMULSION INTRAVENOUS at 09:53

## 2021-07-14 ASSESSMENT — ACTIVITIES OF DAILY LIVING (ADL)
ADLS_ACUITY_SCORE: 18
ADLS_ACUITY_SCORE: 19

## 2021-07-14 ASSESSMENT — ENCOUNTER SYMPTOMS
SEIZURES: 0
DYSRHYTHMIAS: 0

## 2021-07-14 ASSESSMENT — LIFESTYLE VARIABLES: TOBACCO_USE: 0

## 2021-07-14 ASSESSMENT — MIFFLIN-ST. JEOR: SCORE: 854.99

## 2021-07-14 NOTE — ANESTHESIA CARE TRANSFER NOTE
Patient: Zoe House    Procedure(s):  RECTAL PROLAPSE REPAIR, PERINEAL RECTOSIGMOIDECTOMY    Diagnosis: Rectal prolapse [K62.3]  Diagnosis Additional Information: No value filed.    Anesthesia Type:   General     Note:    Oropharynx: oropharynx clear of all foreign objects and spontaneously breathing  Level of Consciousness: drowsy  Oxygen Supplementation: face mask  Level of Supplemental Oxygen (L/min / FiO2): 6  Independent Airway: airway patency satisfactory and stable  Dentition: dentition unchanged  Vital Signs Stable: post-procedure vital signs reviewed and stable  Report to RN Given: handoff report given  Patient transferred to: PACU  Comments: Neuromuscular blockade reversed after TOF 4/4, spontaneous respirations, adequate tidal volumes, followed commands to voice, oropharynx suctioned with soft flexible catheter, extubated atraumatically, extubated with suction, airway patent after extubation.  Oxygen via facemask at 6 liters per minute to PACU. Oxygen tubing connected to wall O2 in PACU, SpO2, NiBP, and EKG monitors and alarms on and functioning, Simona Hugger warmer connected to patient gown, report on patient's clinical status given to PACU RN, RN questions answered.     Handoff Report: Identifed the Patient, Identified the Reponsible Provider, Reviewed the pertinent medical history, Discussed the surgical course, Reviewed Intra-OP anesthesia mangement and issues during anesthesia, Set expectations for post-procedure period and Allowed opportunity for questions and acknowledgement of understanding      Vitals: (Last set prior to Anesthesia Care Transfer)  CRNA VITALS  7/14/2021 0952 - 7/14/2021 1029      7/14/2021             NIBP:  138/56    NIBP Mean:  95        Electronically Signed By: LUIZ Pelaez CRNA  July 14, 2021  10:29 AM

## 2021-07-14 NOTE — OP NOTE
OPERATIVE REPORT    PREOPERATIVE DIAGNOSIS:  Full thickness rectal prolapse.     POSTOPERATIVE DIAGNOSIS:  Same     OPERATION PERFORMED:  Perineal rectosigmoidectomy.     SURGEON:  Ashley Andino M.D.    ASSISTANT:    ALEXUS Manzo MD ([facility] Colorectal Surgery Fellow)    ANESTHESIA:  General.     INDICATIONS:  The patient is a 88 year old female with full thickness rectal prolapse.  She has [facility] and th prolapse protrudes approximately 5 cm and has always been reducible.  She has difficulty with anal hygiene due to the prolapse and has been incontinent to solid and liquid stool.  Her incontinence has worsened.  On physical examination, she was found to have full-thickness rectal prolapse.  She will now undergo a perineal rectosigmoidectomy for rectal prolapse .     OPERATIVE FINDINGS:  The patient's rectum was redundant and could easily be prolapsed out of the anus for 5 cm.  The anal sphincter was widely patulous with minimal tone.  There was some tissue posteriorly and anteriorly to buttress the puborectalis and perineal body respectively.  Approximately 18 inches of rectum and redundant sigmoid were removed.  The patient's sigmoid colon was redundant with a large sigmoid loop.     PROCEDURE IN DETAIL:  After informed consent was obtained, the patient was brought to the operating room where general anesthesia was induced without difficulty.  She was flipped into the well-padded prone jackknife position with the buttocks taped apart.  A Thompson catheter was inserted.  Her perineum and vagina were sterilely prepped and draped in usual fashion.     The rectum was prolapsed out of the anus using multiple Allis clamps for a length of 5 to 6 cm.  A Lone Star retractor was inserted with sharp hooks in the perianal skin just outside the anoderm.    Circumferential dissection was then continued to the outside of the anorectal tube.  The peritoneal  cul-de-sac was easily identified and  from the rectum anteriorly.  Careful attention was made not to enter the vagina.  The cul-de-sac was then entered by incising the peritoneum using Ligasure device.  The mesorectum was then retracted superiorly.  The mesorectum was then taken down and divided using the small hand-held LigaSure device working proximally.  A fairly large redundant sigmoid loop was present proximal to the rectum.  The mesorectum and sigmoid colonic mesentery were taken down using the Ligasure device straightening out a large sigmoid loop.  Approximately 33 cm of rectum and sigmoid colon were delivered through the anorectal ring.  The segments of mesorectum and sigmoid colonic mesentery were inspected for hemostasis, which was meticulous.     The Lone Star hooks were replaced into the anoderm cuff in preparation for the anastomosis.  The area of demarcation on the sigmoid colon was clearly identified, and the colon was divided proximally at this point in an area where the colon was pink and viable with excellent blood supply.  The colon was divided slowly placing 4-quadrant 2-0 vicryl sutures into the rectum as it was divided.  The specimen measured 33 cm in length and was removed from the operative field and submitted for pathological analysis.     The anastomosis was then completed using many interrupted 3-0 Vicryl sutures using the 2-0 vicryl 4-quadrant sutures as a guide.  The anastomosis was tension-free with excellent blood supply.  Hemostasis was meticulous.  The Lone Star retractor was removed allowing the anastomosis to retract into the anal canal.  30 mL of 0.5% Marcaine with epinephrine was injected into the perianal area for post-operative analgesia.  A sterile gauze dressing was applied.    The patient tolerated the procedure well without complications.  Estimated blood loss was 5 mL.  I was present and scrubbed for the entire duration of the operation.  The patient was returned  to the supine position and extubated in the operating room.  She was brought to the recovery room in stable condition.    Ashley Andino MD

## 2021-07-14 NOTE — CONSULTS
[facility]  Consult Note - Hospitalist Service     Date of Admission:  7/14/2021  Consult Requested by: QUENTIN  Reason for Consult: medical management    Assessment & Plan   Zoe House is a 88 year old female with PMHx of HTN, hyperlipidemia, T2DM, chronic right shoulder pain and weakness with right shoulder cuff syndrome who was admitted by the colorectal surgery service on 7/14/21 for underwent perineal rectosigmoidectomy for full thickness rectal prolapse. Hospitalist service consulted for medical co-management.      Full thickness rectal prolapse s/p perineal rectosigmoidectomy (7/14/21)  Surgery per Dr. Andino. GETA. Minimal EBL.   - Defer routine post-operative cares, IVF, abx, DVT prophylaxis and pain control to primary service   - Encourage pulmonary toilet; incentive spirometer at bedside, pt demonstrated use during consultation  - Bowel regimen in place while on narcotics   - CBC, BMP, lytes in AM     Hypertension  Hyperlipidemia  PTA regimen hydrochlorothiazide 12.5 mg daily, lisinopril 2.5 mg daily, rosuvastatin 10 mg daily  - Resume PTA regimen on POD #1 with hold parameters  - PRN hydralazine for sBP > 180    Bradycardia with PACs  Appears chronically 60s per notes and patient. No blocks evident. No further workup.   - Maintain electrolytes    Mild hypokalemia   3.1 preoperatively, repleted.  - Lyte repletion protocol    Non-Insulin dependent type 2 diabetes  PTA Regimen: Glipizide 2.5 mg daily, Metformin 1000 mg twice daily  Last HbA1c 8.4% 8/2020  Received 4mg dexamethasone intraoperatively.  - Hypoglycemia protocol  - Preprandial and HS fingerstick checks or Q 4 hours while NPO  - Hold PTA oral antiglycemics during hospitalization, can resume on discharge  - Sliding scale insulin Medium, may need to increase given steroid dose intraoperatively  - Recheck HbA1c in AM    Recent Labs   Lab 07/14/21  1039 07/14/21  0559 07/13/21  1046   * 129* 155*       Recent UTI  UA  completed preperatively showing E coli >100k on culture.  - Started on cefadroxil by PCP, to complete course 7/15. Pt has bottle with her here and not on formulary. D/w pharmacist, will order so patient can take during admission to complete her course.    Chronic kidney disease, stage 3  Baseline Cr 1.35 range preoperatively. Check in AM  - Avoid nephrotoxins - contrast, NSAIDs  - Renally dose medications as able/needed  - Monitor    Glaucoma: Continue PTA eye drops    PAD s/p carotid artery angioplasty (1/2020)     The patient's care was discussed with the Attending Physician, Dr. Esposito, Bedside Nurse, Patient, Patient's Family and Pharmacist.    Oma Ventura PA-C  [facility]  Securely message with the Vocera Web Console (learn more here)  Text page via [employer] Paging/Directory    ______________________________________________________________________    Chief Complaint   Rectal prolapse    History is obtained from the patient and electronic health record    History of Present Illness   Zoe Huose is a 88 year old female's medical history significant for essential hypertension, hyperlipidemia, non-insulin-dependent type 2 diabetes, right rotator cuff syndrome, internal hemorrhoids who is admitted on 7/14/2021 by the colorectal surgery service for planned perineal rectosigmoidectomy due to rectal prolapse. GETA. EBL <10mL. No drains placed. No immediate postoperative complications. Preop H&P reviewed.  EKG reportedly unchanged from prior, image unavailable. Finishing course of cefadroxil 500mg BID from PCP for E coli UTI, has bottle with her. Not on [facility] formulary.    During my visit, patient hemodynamically stable. Feeling well and without pain. No current c/o. HRs 50s and 60s without heart blocks. HR appears chronically on the low side. No C/o, confirmed code status with family at bedside.     Review of Systems   The 10 point Review of Systems is negative other than noted  in the [facility] or here.     Past Medical History    I have reviewed this patient's medical history and updated it with pertinent information if needed.   Past Medical History:   Diagnosis Date     Chronic right shoulder pain      Diabetes (H)      High cholesterol      Hypertension      Rectal prolapse      Rotator cuff syndrome, right    Trigger finger  right index finger    Past Surgical History   I have reviewed this patient's surgical history and updated it with pertinent information if needed.  Past Surgical History:   Procedure Laterality Date     BREAST SURGERY      breast lumpectomy, benign on left     COLONOSCOPY N/A 7/13/2021    Procedure: Colonoscopy, With Polypectomy And Biopsy;  Surgeon: Coco Andino MD;  Location:  GI     ENT SURGERY      tonsil and adenoidectomy      GENITOURINARY SURGERY      bladder suspension     GYN SURGERY  2000    hysterectomy     VASCULAR SURGERY      carotid angioplasty left       Social History   I have reviewed this patient's social history and updated it with pertinent information if needed.  Social History     Tobacco Use     Smoking status: Never Smoker     Smokeless tobacco: Never Used   Vaping Use     Vaping Use: Never used   Substance Use Topics     Alcohol use: Never     Drug use: Never       Family History   I have reviewed this patient's family history and updated it with pertinent information if needed.  Family History   Problem Relation Age of Onset     Cerebrovascular Disease Mother      Cerebrovascular Disease Brother      Colon Cancer No family hx of        Medications   I have reviewed this patient's current medications  Medications Prior to Admission   Medication Sig Dispense Refill Last Dose     ALPRAZolam (XANAX) 0.25 MG tablet Take 0.25 mg by mouth   7/12/2021 at PRN     aspirin (ASA) 325 MG tablet    7/12/2021 at AM     glipiZIDE (GLUCOTROL) 5 MG tablet Take 2.5 mg by mouth every morning (0.5 X 5 mg)   7/12/2021 at AM     hydrochlorothiazide  (HYDRODIURIL) 25 MG tablet Take 12.5 mg by mouth daily (0.5 X 25 mg)   7/12/2021 at AM     latanoprost (XALATAN) 0.005 % ophthalmic solution    7/14/2021 at AM     lisinopril (ZESTRIL) 2.5 MG tablet Take 2.5 mg by mouth   7/12/2021 at AM     metFORMIN (GLUCOPHAGE-XR) 500 MG 24 hr tablet Take 1,000 mg by mouth 2 times daily (2 X 500 mg)   7/12/2021 at PM     ondansetron (ZOFRAN) 4 MG tablet Take 4 mg by mouth    at PRN     rosuvastatin (CRESTOR) 10 MG tablet Take 10 mg by mouth   7/12/2021 at PM     timolol maleate (TIMOPTIC) 0.5 % ophthalmic solution Apply 1 drop to eye   7/14/2021 at AM     metroNIDAZOLE (FLAGYL) 500 MG tablet TAKE 1 TABLET AT 1 PM 2 PM AND 11 PM THE DAY BEFORE SURGERY   7/13/2021 at 2300     neomycin (MYCIFRADIN) 500 MG tablet TAKE 2 TABLETS AT 1 PM 2 PM AND 11 PM THE DAY BEFORE SURGERY   7/13/2021 at 2300       Allergies   Allergies   Allergen Reactions     Melatonin Other (See Comments)     fainted  L sided numbness of whole body     Sulfa Drugs Hives       Physical Exam   Vital Signs: Temp: 97.7  F (36.5  C) Temp src: Axillary BP: 126/48 Pulse: 63   Resp: 14 SpO2: 96 % O2 Device: None (Room air) Oxygen Delivery: 2 LPM  Weight: 118 lbs 0 oz    General: Awake, alert, elderly woman who appears stated age. Looks comfortable sitting up in bed. No acute distress.  HEENT: Normocephalic, atraumatic. Extraocular movements intact.   Respiratory: Clear to auscultation bilaterally, no rales, wheezing, or rhonchi.  Cardiovascular: Regular rate and rhythm, +S1 and S2, no murmur auscultated. No peripheral edema. PCDs in place.   Gastrointestinal: Soft, non-tender, non-distended. Bowel sounds present. Thompson in place.  Skin: Warm, dry. No obvious rashes or lesions on exposed skin. Dorsalis pedis pulses palpable bilaterally.  Musculoskeletal: No joint swelling, erythema or tenderness. Moves all extremities equally.  Neurologic: AAO x3. Cranial nerves 2-12 grossly intact, normal strength and  sensation.  Psychiatric: Appropriate mood and affect. No obvious anxiety or depression.    Data   Results for orders placed or performed during the hospital encounter of 07/14/21 (from the past 24 hour(s))   Potassium   Result Value Ref Range    Potassium 3.1 (L) 3.4 - 5.3 mmol/L   Glucose   Result Value Ref Range    Glucose 129 (H) 70 - 99 mg/dL    Patient Fasting > 8hrs? Yes    EKG 12 lead   Result Value Ref Range    Systolic Blood Pressure  mmHg    Diastolic Blood Pressure  mmHg    Ventricular Rate 52 BPM    Atrial Rate 52 BPM    CA Interval 168 ms    QRS Duration 128 ms     ms    QTc 485 ms    P Axis 47 degrees    R AXIS -21 degrees    T Axis 57 degrees    Interpretation ECG Click View Image link to view waveform and result    Glucose by meter   Result Value Ref Range    GLUCOSE BY METER POCT 162 (H) 70 - 99 mg/dL

## 2021-07-14 NOTE — ANESTHESIA PREPROCEDURE EVALUATION
Anesthesia Pre-Procedure Evaluation    Patient: Zoe House   MRN: [id number] : 1933        Preoperative Diagnosis: Rectal prolapse [K62.3]   Procedure : Procedure(s):  RECTAL PROLAPSE REPAIR, PERINEAL RECTOSIGMOIDECTOMY     Past Medical History:   Diagnosis Date     Chronic right shoulder pain      Diabetes (H)      High cholesterol      Hypertension      Rectal prolapse      Rotator cuff syndrome, right       Past Surgical History:   Procedure Laterality Date     BREAST SURGERY      breast lumpectomy, benign on left     COLONOSCOPY N/A 2021    Procedure: Colonoscopy, With Polypectomy And Biopsy;  Surgeon: Coco Andino MD;  Location:  GI     ENT SURGERY      tonsil and adenoidectomy      GENITOURINARY SURGERY      bladder suspension     GYN SURGERY      hysterectomy     VASCULAR SURGERY      carotid angioplasty left      Allergies   Allergen Reactions     Melatonin      Sulfa Drugs Hives      Social History     Tobacco Use     Smoking status: Never Smoker     Smokeless tobacco: Never Used   Substance Use Topics     Alcohol use: Never      Wt Readings from Last 1 Encounters:   21 53.5 kg (118 lb)        Anesthesia Evaluation   Pt has had prior anesthetic. Type: General.    History of anesthetic complications   slow to wake.    ROS/MED HX  ENT/Pulmonary:    (-) tobacco use, asthma and sleep apnea   Neurologic:     (+) no peripheral neuropathy  (-) no seizures and no CVA   Cardiovascular:     (+) Dyslipidemia hypertension----- (-) CAD and arrhythmias   METS/Exercise Tolerance: 3 - Able to walk 1-2 blocks without stopping    Hematologic:       Musculoskeletal: Comment: R shoulder pain      GI/Hepatic: Comment: Rectal prolapse   (-) GERD   Renal/Genitourinary: Comment: Treated for recent UTI   (-) renal disease   Endo:     (+) type II DM, Not using insulin,  (-) thyroid disease   Psychiatric/Substance Use:       Infectious Disease:    (-) Recent Fever   Malignancy:       Other:             Physical Exam    Airway  airway exam normal      Mallampati: II   TM distance: > 3 FB   Neck ROM: full   Mouth opening: > 3 cm    Respiratory Devices and Support         Dental  no notable dental history         Cardiovascular   cardiovascular exam normal          Pulmonary   pulmonary exam normal                OUTSIDE LABS:  CBC: No results found for: WBC, HGB, HCT, PLT  BMP:   Lab Results   Component Value Date    POTASSIUM 3.1 (L) 07/14/2021     (H) 07/14/2021     (H) 07/13/2021     COAGS: No results found for: PTT, INR, FIBR  POC: No results found for: BGM, HCG, HCGS  HEPATIC: No results found for: ALBUMIN, PROTTOTAL, ALT, AST, GGT, ALKPHOS, BILITOTAL, BILIDIRECT, EMMANUEL  OTHER: No results found for: PH, LACT, A1C, JOSÉ ANTONIO, PHOS, MAG, LIPASE, AMYLASE, TSH, T4, T3, CRP, SED    Anesthesia Plan    ASA Status:  2   NPO Status:  NPO Appropriate    Anesthesia Type: General.     - Airway: ETT   Induction: Intravenous.   Maintenance: Balanced.   Techniques and Equipment:     - Airway: Video-Laryngoscope         Consents    Anesthesia Plan(s) and associated risks, benefits, and realistic alternatives discussed. Questions answered and patient/representative(s) expressed understanding.     - Discussed with:  Patient         Postoperative Care    Pain management: IV analgesics, Oral pain medications.   PONV prophylaxis: Ondansetron (or other 5HT-3), Dexamethasone or Solumedrol, Background Propofol Infusion     Comments:    No versed, minimize narcotics as able.    K 3.1 - 10mEQ IV in preop            Sebastien Alan MD

## 2021-07-14 NOTE — BRIEF OP NOTE
[facility]    Brief Operative Note    Pre-operative diagnosis: Rectal prolapse [K62.3]  Post-operative diagnosis Same as pre-operative diagnosis    Procedure: Procedure(s):  RECTAL PROLAPSE REPAIR, PERINEAL RECTOSIGMOIDECTOMY  Surgeon: Surgeon(s) and Role:     * Coco Andino MD - Primary     * Joanna Brown MD - Assisting  Anesthesia: General   Estimated blood loss: 10  Drains: None  Specimens:   ID Type Source Tests Collected by Time Destination   1 : rectum and sigmoid colon Tissue Rectum SURGICAL PATHOLOGY EXAM Coco Andino MD 7/14/2021  8:53 AM      Findings:   redundant rectum and sigmoid colon.  Complications: None.  Implants: * No implants in log *      Condition on discharge from OR: Satisfactory    Sen Moore MD   [facility]   [phone].        ADDENDUM:    PATIENT DATA  Indicate Y or N:  Home O2 No  Hemodialysis  No  Transplant patient  No  Cirrhosis  No  Steroids in last 30 days  No  Immunomodulators in last 30 days  No  Anticoagulation at time of surgery  No   List medication   Prior abdominal surgery  No  Pelvic irradiation  No    Albumin within 30 days if known    Hgb within 30 days if known  No results found for: HGB]  Cr within 30 days if known  No results found for: CR]  Body mass index is 24.66 kg/m .      OR DATA  Emergent  No   <24 hours  No   <1 week  No  Bowel Prep Yes  Antibiotics  Yes  DVT prophylaxis    Heparin  Yes   SCD  Yes   None  No  Drain  No  ASA (1,2,3,4) 2  OR time (min) 115  Stents  No  Transfuse >/= 2U  No  Anastomosis   Stapled  No   Handsewn  Yes  Leak Test    Positive  No   Negative  No   Not done  Yes

## 2021-07-14 NOTE — ANESTHESIA PROCEDURE NOTES
Airway         Procedure Start/Stop Times: 7/14/2021 7:40 AM  Staff -        Anesthesiologist:  Sebastien Alan MD       CRNA: Mary Loja APRN CRNA       Other Anesthesia Staff: Mihir Minor       Performed By: TEODORO  Consent for Airway        Urgency: elective  Indications and Patient Condition       Indications for airway management: shirley-procedural       Induction type:intravenous       Mask difficulty assessment: 1 - vent by mask    Final Airway Details       Final airway type: endotracheal airway       Successful airway: ETT - single  Endotracheal Airway Details        ETT size (mm): 7.0       Cuffed: yes       Successful intubation technique: video laryngoscopy       VL Blade Size: Buenrostro 3       Grade View of Cords: 2 (2b)       Adjucts: stylet       Position: Right       Measured from: lips       Secured at (cm): 21       Bite block used: None    Post intubation assessment        Placement verified by: capnometry, equal breath sounds and chest rise        Number of attempts at approach: 1       Ease of procedure: easy       Dentition: Intact and Unchanged    Additional Comments       Criocid pressure improved view to grade 2a

## 2021-07-14 NOTE — PROGRESS NOTES
"Called into room by family as patient was feeling \"faint.\" from the pain from the potassium infusion. Potassium stopped  and then patient then became unresponsive.  Dr. Sparks immediately at bedside and Dr. Alan was called to bedside.  O2 placed, Hr down to upper 20's (at the lowest), BP 80's/50's.  Atropine given by Dr. Alan, ephedrine and Robinul given by SCRNA.  EKG done and VS stabilized.      Family at bedside and kept informed.    "

## 2021-07-14 NOTE — PROGRESS NOTES
PTA medications completed by Medication Scribe day of surgery     Medication history sources: Patient and H&P  In the past week, patient estimated taking medication this percent of the time: Greater than 90%  Adherence assessment: N/A Not Observed    Significant changes made to the medication list:  Patient reports no longer taking the following meds (med scribe removed from PTA med list): Hydrocortisone 2.5% Cream      Additional medication history information:   Patient brought own home meds: Latanoprost, Timoptic    Medication reconciliation completed by provider prior to medication history? No    Time spent in this activity: 25 minutes    The information provided in this note is only as accurate as the sources available at the time of update(s)      Prior to Admission medications    Medication Sig Last Dose Taking? Auth Provider   ALPRAZolam (XANAX) 0.25 MG tablet Take 0.25 mg by mouth 7/12/2021 at PRN Yes Reported, Patient   aspirin (ASA) 325 MG tablet  7/12/2021 at AM Yes Reported, Patient   glipiZIDE (GLUCOTROL) 5 MG tablet Take 2.5 mg by mouth every morning (0.5 X 5 mg) 7/12/2021 at AM Yes Reported, Patient   hydrochlorothiazide (HYDRODIURIL) 25 MG tablet Take 12.5 mg by mouth daily (0.5 X 25 mg) 7/12/2021 at AM Yes Reported, Patient   latanoprost (XALATAN) 0.005 % ophthalmic solution  7/14/2021 at AM Yes Reported, Patient   lisinopril (ZESTRIL) 2.5 MG tablet Take 2.5 mg by mouth 7/12/2021 at AM Yes Reported, Patient   metFORMIN (GLUCOPHAGE-XR) 500 MG 24 hr tablet Take 1,000 mg by mouth 2 times daily (2 X 500 mg) 7/12/2021 at PM Yes Reported, Patient   ondansetron (ZOFRAN) 4 MG tablet Take 4 mg by mouth  at PRN Yes Reported, Patient   rosuvastatin (CRESTOR) 10 MG tablet Take 10 mg by mouth 7/12/2021 at PM Yes Reported, Patient   timolol maleate (TIMOPTIC) 0.5 % ophthalmic solution Apply 1 drop to eye 7/14/2021 at AM Yes Reported, Patient   metroNIDAZOLE (FLAGYL) 500 MG tablet TAKE 1 TABLET AT 1 PM 2 PM AND  11 PM THE DAY BEFORE SURGERY 7/13/2021 at 2300  Reported, Patient   neomycin (MYCIFRADIN) 500 MG tablet TAKE 2 TABLETS AT 1 PM 2 PM AND 11 PM THE DAY BEFORE SURGERY 7/13/2021 at 2300  Reported, Patient       Jorge Alberto Ring, [facility]  [facility] [employer]

## 2021-07-14 NOTE — ANESTHESIA POSTPROCEDURE EVALUATION
Patient: Zoe House    Procedure(s):  RECTAL PROLAPSE REPAIR, PERINEAL RECTOSIGMOIDECTOMY    Diagnosis:Rectal prolapse [K62.3]  Diagnosis Additional Information: No value filed.    Anesthesia Type:  General    Note:  Disposition: Admission   Postop Pain Control: Uneventful            Sign Out: Well controlled pain   PONV: No   Neuro/Psych: Uneventful            Sign Out: Acceptable/Baseline neuro status   Airway/Respiratory: Uneventful            Sign Out: Acceptable/Baseline resp. status; O2 supplementation               Oxygen: Nasal Cannula   CV/Hemodynamics: Uneventful            Sign Out: Acceptable CV status   Other NRE: NONE   DID A NON-ROUTINE EVENT OCCUR? No           Last vitals:  Vitals:    07/14/21 1050 07/14/21 1100 07/14/21 1110   BP: (!) 146/59 137/53    Pulse: 53 53 52   Resp: 15 16 18   Temp:   36.1  C (96.9  F)   SpO2: 99% 98% 98%       Last vitals prior to Anesthesia Care Transfer:  CRNA VITALS  7/14/2021 0952 - 7/14/2021 1052      7/14/2021             NIBP:  138/56    NIBP Mean:  95          Electronically Signed By: Sebastien Alan MD  July 14, 2021  11:17 AM

## 2021-07-15 LAB
ANION GAP SERPL CALCULATED.3IONS-SCNC: 12 MMOL/L (ref 3–14)
BUN SERPL-MCNC: 16 MG/DL (ref 7–30)
CALCIUM SERPL-MCNC: 8.6 MG/DL (ref 8.5–10.1)
CHLORIDE BLD-SCNC: 105 MMOL/L (ref 94–109)
CO2 SERPL-SCNC: 15 MMOL/L (ref 20–32)
CREAT SERPL-MCNC: 1.06 MG/DL (ref 0.52–1.04)
GFR SERPL CREATININE-BSD FRML MDRD: 47 ML/MIN/1.73M2
GLUCOSE BLD-MCNC: 116 MG/DL (ref 70–99)
GLUCOSE BLDC GLUCOMTR-MCNC: 149 MG/DL (ref 70–99)
GLUCOSE BLDC GLUCOMTR-MCNC: 184 MG/DL (ref 70–99)
GLUCOSE BLDC GLUCOMTR-MCNC: 225 MG/DL (ref 70–99)
GLUCOSE BLDC GLUCOMTR-MCNC: 245 MG/DL (ref 70–99)
HBA1C MFR BLD: 6.3 % (ref 0–5.6)
MAGNESIUM SERPL-MCNC: 1.9 MG/DL (ref 1.6–2.3)
PATH REPORT.COMMENTS IMP SPEC: NORMAL
PATH REPORT.COMMENTS IMP SPEC: NORMAL
PATH REPORT.FINAL DX SPEC: NORMAL
PATH REPORT.GROSS SPEC: NORMAL
PATH REPORT.MICROSCOPIC SPEC OTHER STN: NORMAL
PATH REPORT.RELEVANT HX SPEC: NORMAL
PHOSPHATE SERPL-MCNC: 3.4 MG/DL (ref 2.5–4.5)
PHOTO IMAGE: NORMAL
POTASSIUM BLD-SCNC: 4 MMOL/L (ref 3.4–5.3)
SODIUM SERPL-SCNC: 132 MMOL/L (ref 133–144)

## 2021-07-15 PROCEDURE — 250N000013 HC RX MED GY IP 250 OP 250 PS 637: Performed by: PHYSICIAN ASSISTANT

## 2021-07-15 PROCEDURE — 83735 ASSAY OF MAGNESIUM: CPT | Performed by: COLON & RECTAL SURGERY

## 2021-07-15 PROCEDURE — 99232 SBSQ HOSP IP/OBS MODERATE 35: CPT | Performed by: INTERNAL MEDICINE

## 2021-07-15 PROCEDURE — 250N000013 HC RX MED GY IP 250 OP 250 PS 637: Performed by: COLON & RECTAL SURGERY

## 2021-07-15 PROCEDURE — 36415 COLL VENOUS BLD VENIPUNCTURE: CPT | Performed by: COLON & RECTAL SURGERY

## 2021-07-15 PROCEDURE — 88307 TISSUE EXAM BY PATHOLOGIST: CPT | Mod: 26 | Performed by: PATHOLOGY

## 2021-07-15 PROCEDURE — 83036 HEMOGLOBIN GLYCOSYLATED A1C: CPT | Performed by: PHYSICIAN ASSISTANT

## 2021-07-15 PROCEDURE — 250N000012 HC RX MED GY IP 250 OP 636 PS 637: Performed by: PHYSICIAN ASSISTANT

## 2021-07-15 PROCEDURE — 80048 BASIC METABOLIC PNL TOTAL CA: CPT | Performed by: COLON & RECTAL SURGERY

## 2021-07-15 PROCEDURE — 84100 ASSAY OF PHOSPHORUS: CPT | Performed by: COLON & RECTAL SURGERY

## 2021-07-15 PROCEDURE — 250N000011 HC RX IP 250 OP 636: Performed by: COLON & RECTAL SURGERY

## 2021-07-15 PROCEDURE — 120N000001 HC R&B MED SURG/OB

## 2021-07-15 RX ORDER — TIMOLOL MALEATE 5 MG/ML
1 SOLUTION/ DROPS OPHTHALMIC 2 TIMES DAILY
Status: DISCONTINUED | OUTPATIENT
Start: 2021-07-15 | End: 2021-07-16 | Stop reason: HOSPADM

## 2021-07-15 RX ADMIN — INSULIN ASPART 1 UNITS: 100 INJECTION, SOLUTION INTRAVENOUS; SUBCUTANEOUS at 17:24

## 2021-07-15 RX ADMIN — HYDROCHLOROTHIAZIDE 12.5 MG: 12.5 TABLET ORAL at 08:13

## 2021-07-15 RX ADMIN — CEFADROXIL 500 MG: 500 CAPSULE ORAL at 08:14

## 2021-07-15 RX ADMIN — ACETAMINOPHEN 975 MG: 325 TABLET, FILM COATED ORAL at 21:59

## 2021-07-15 RX ADMIN — ROSUVASTATIN CALCIUM 10 MG: 10 TABLET, FILM COATED ORAL at 20:16

## 2021-07-15 RX ADMIN — LISINOPRIL 2.5 MG: 2.5 TABLET ORAL at 08:12

## 2021-07-15 RX ADMIN — TIMOLOL MALEATE 1 DROP: 5 SOLUTION/ DROPS OPHTHALMIC at 20:16

## 2021-07-15 RX ADMIN — HEPARIN SODIUM 5000 UNITS: 5000 INJECTION, SOLUTION INTRAVENOUS; SUBCUTANEOUS at 22:00

## 2021-07-15 RX ADMIN — CEFADROXIL 500 MG: 500 CAPSULE ORAL at 20:16

## 2021-07-15 RX ADMIN — HEPARIN SODIUM 5000 UNITS: 5000 INJECTION, SOLUTION INTRAVENOUS; SUBCUTANEOUS at 14:06

## 2021-07-15 RX ADMIN — METRONIDAZOLE 500 MG: 500 INJECTION, SOLUTION INTRAVENOUS at 08:12

## 2021-07-15 RX ADMIN — INSULIN ASPART 1 UNITS: 100 INJECTION, SOLUTION INTRAVENOUS; SUBCUTANEOUS at 08:15

## 2021-07-15 RX ADMIN — TIMOLOL MALEATE 1 DROP: 5 SOLUTION/ DROPS OPHTHALMIC at 08:14

## 2021-07-15 RX ADMIN — CEFAZOLIN 1 G: 1 INJECTION, POWDER, FOR SOLUTION INTRAMUSCULAR; INTRAVENOUS at 06:33

## 2021-07-15 RX ADMIN — INSULIN ASPART 2 UNITS: 100 INJECTION, SOLUTION INTRAVENOUS; SUBCUTANEOUS at 11:49

## 2021-07-15 RX ADMIN — LATANOPROST 1 DROP: 50 SOLUTION/ DROPS OPHTHALMIC at 20:15

## 2021-07-15 RX ADMIN — HEPARIN SODIUM 5000 UNITS: 5000 INJECTION, SOLUTION INTRAVENOUS; SUBCUTANEOUS at 06:33

## 2021-07-15 RX ADMIN — ACETAMINOPHEN 975 MG: 325 TABLET, FILM COATED ORAL at 06:33

## 2021-07-15 RX ADMIN — ACETAMINOPHEN 975 MG: 325 TABLET, FILM COATED ORAL at 14:06

## 2021-07-15 ASSESSMENT — ACTIVITIES OF DAILY LIVING (ADL)
ADLS_ACUITY_SCORE: 20

## 2021-07-15 NOTE — PROGRESS NOTES
[facility] [place]    HOSPITALIST PROGRESS NOTE :   --------------------------------------------------    Date of Admission:  7/14/2021    Cumulative Summary: Zoe House is a 88 year old female with PMHx of HTN, hyperlipidemia, T2DM, chronic right shoulder pain and weakness with right shoulder cuff syndrome who was admitted by the colorectal surgery service on 7/14/21 for underwent perineal rectosigmoidectomy for full thickness rectal prolapse. Hospitalist service consulted for medical co-management.      Assessment & Plan     Full thickness rectal prolapse s/p perineal rectosigmoidectomy (7/14/21)  Surgery per Dr. Andino. TEO. Minimal EBL.     -- Patient consult care was assumed this morning , seen and examined, feeling better , not sure if she is ready to go home today, she lives with her  who is blind and would not be able to help her at home   -- Defer routine post-operative cares, IVF, abx, DVT prophylaxis and pain control to primary service   -- Encourage pulmonary toilet; incentive spirometer at bedside, pt demonstrated use during consultation  -- Bowel regimen in place while on narcotics   -- Agree with removing the saldivar , will discontinue IV fluids as she is starting to eat and drink      Hypertension  Hyperlipidemia  PTA regimen hydrochlorothiazide 12.5 mg daily, lisinopril 2.5 mg daily, rosuvastatin 10 mg daily  -- Resume PTA regimen from today , patient has already received hydrochlorothiazide this morning which might be contributing to her hyponatremia , will recommend PCP to consider changing hydrochlorothiazide to a different antihypertensive med   -- PRN hydralazine for sBP > 180     Bradycardia with PACs  Appears chronically 60s per notes and patient. No blocks evident. No further workup.   -- Maintain electrolytes, potassium is improved      Mild hypokalemia   3.1 preoperatively, repleted.  -- Lyte repletion protocol     Non-Insulin dependent type 2 diabetes  PTA  Regimen: Glipizide 2.5 mg daily, Metformin 1000 mg twice daily  Last HbA1c 8.4% 8/2020  Received 4mg dexamethasone intraoperatively.  -- Hypoglycemia protocol  -- Will change Accu checks to QAC and HS  -- Hold PTA oral antiglycemics during hospitalization, can resume on discharge  -- Sliding scale insulin Medium, may need to increase given steroid dose intraoperatively  -- HbA1c came back 6.3    Recent UTI  UA completed preperatively showing E coli >100k on culture.  -- Started on cefadroxil by PCP, to complete course 7/15. Pt has bottle with her here and not on formulary. D/w pharmacist, will order so patient can take during admission to complete her course.     Chronic kidney disease, stage 3  Baseline Cr 1.35 range preoperatively. Check in AM  -- Avoid nephrotoxins - contrast, NSAIDs  -- Renally dose medications as able/needed  -- Monitor     Glaucoma:   --Continue PTA eye drops     PAD s/p carotid artery angioplasty (1/2020)    Diet: Low Fiber Diet    Thompson Catheter: PRESENT, indication: /GI/GYN Pelvic Procedure  DVT Prophylaxis: Pneumatic Compression Devices  Code Status: Full Code    The patient's care was discussed with the Patient.    Disposition Plan   Expected discharge: 07/17/2021 , patient is expected to be discharged to home once ready , final disposition per surgery team , patient is stable medically and can be discharged on home med's when ready. Hospital medicine will sign off at this pointplease don't hesitate to contact/ re consult if needed    Taylor Esposito MD, FACP  Text Page (7am - 6pm)    ----------------------------------------------------------------------------------------------------------------------    Interval History   Patient Care was assumed this morning , seen and examined  Lying in bed, starting to feel better , did have breakfast this morning , denying nausea or vomiting   She is denying any chest pain, palpitations or SOB, discussed with her regarding her current medications ,  she will be finishing the course of antibiotics for recent UTI     -Data reviewed today: I reviewed all new labs and imaging results over the last 24 hours.    I personally reviewed no images or EKG's today.    Physical Exam   Temp: 98.7  F (37.1  C) Temp src: Oral BP: 125/49 Pulse: 79   Resp: 21 SpO2: 98 % O2 Device: None (Room air) Oxygen Delivery: 2 LPM  Vitals:    07/14/21 0550   Weight: 53.5 kg (118 lb)     Vital Signs with Ranges  Temp:  [96.3  F (35.7  C)-98.7  F (37.1  C)] 98.7  F (37.1  C)  Pulse:  [52-79] 79  Resp:  [8-21] 21  BP: (112-152)/(45-63) 125/49  SpO2:  [93 %-100 %] 98 %  I/O last 3 completed shifts:  In: 1320 [P.O.:120; I.V.:1200]  Out: 960 [Urine:960]    GENERAL: Alert , awake and oriented. NAD. Conversational, appropriate.   HEENT: Normocephalic. EOMI. No icterus or injection. Nares normal.   LUNGS: Clear to auscultation. No dyspnea at rest.   HEART: Regular rate. Extremities perfused.   ABDOMEN: Soft, nontender, and nondistended. Positive bowel sounds.   EXTREMITIES: No LE edema noted.   NEUROLOGIC: Moves extremities x4 on command. No acute focal neurologic abnormalities noted.     Medications     lactated ringers 75 mL/hr at 07/14/21 2317       acetaminophen  975 mg Oral Q8H     cefadroxil  500 mg Oral BID     heparin ANTICOAGULANT  5,000 Units Subcutaneous Q8H     hydrochlorothiazide  12.5 mg Oral Daily     insulin aspart  1-7 Units Subcutaneous TID AC     insulin aspart  1-5 Units Subcutaneous At Bedtime     latanoprost  1 drop Both Eyes QPM     lisinopril  2.5 mg Oral Daily     rosuvastatin  10 mg Oral Daily     sodium chloride (PF)  3 mL Intracatheter Q8H     timolol maleate  1 drop Ophthalmic Daily       Data   Recent Labs   Lab 07/15/21  0806 07/15/21  0648 07/14/21  2155 07/14/21  1548 07/14/21  0559   PLT  --   --   --  342  --    NA  --  132*  --   --   --    POTASSIUM  --  4.0  --   --  3.1*   CHLORIDE  --  105  --   --   --    CO2  --  15*  --   --   --    BUN  --  16  --   --    --    CR  --  1.06*  --   --   --    ANIONGAP  --  12  --   --   --    JOSÉ ANTONIO  --  8.6  --   --   --    * 116* 151*  --  129*       Imaging:   No results found for this or any previous visit (from the past 24 hour(s)).

## 2021-07-15 NOTE — PLAN OF CARE
7/15/21 [phone] POD #1 Rectal Prolapse  A&Ox4. SBA with gait belt, up to bathroom. VSS on RA. Tele NST occ PVCs. Denies pain, mild ache to rectum. Scheduled Tylenol.  Low Fiber diet, tolerating well. Continent, removed saldivar this morning and voiding. Stated to have had felt a small BM, passing flatus. K 4.0. , 225, 184. PIV SL, intermittent abx for treatment of UTI - Cefepime & Flagyl. Skin CDI, dressing to rectum. CMS and pulses present. Using IS & not needing ice to bottom. Discharge pending to home.

## 2021-07-15 NOTE — PROGRESS NOTES
COLON & RECTAL SURGERY  PROGRESS NOTE    July 15, 2021  Post-op Day #1     SUBJECTIVE:  Doing well. Has not been OOB yet. Tolerating FLD. No n/v. +flatus, -BM.     OBJECTIVE:  Temp:  [96.3  F (35.7  C)-98.7  F (37.1  C)] 98.7  F (37.1  C)  Pulse:  [52-79] 79  Resp:  [8-21] 21  BP: (112-152)/(45-63) 125/49  SpO2:  [93 %-100 %] 98 %    Intake/Output Summary (Last 24 hours) at 7/15/2021 0920  Last data filed at 7/15/2021 0600  Gross per 24 hour   Intake 1320 ml   Output 810 ml   Net 510 ml       GENERAL:  Awake, alert, no acute distress  HEAD: Normocephalic atraumatic  SCLERA: Anicteric  EXTREMITIES: Warm and well perfused  ABDOMEN:  Soft, appropriately tender, non-distended. No guarding, rigidity, or peritoneal signs.     LABS:  No results found for: WBC  No results found for: HGB  No results found for: HCT  Lab Results   Component Value Date     07/14/2021     Last Basic Metabolic Panel:  Lab Results   Component Value Date     07/15/2021      Lab Results   Component Value Date    POTASSIUM 4.0 07/15/2021     Lab Results   Component Value Date    CHLORIDE 105 07/15/2021     Lab Results   Component Value Date    JOSÉ ANTONIO 8.6 07/15/2021     Lab Results   Component Value Date    CO2 15 07/15/2021     Lab Results   Component Value Date    BUN 16 07/15/2021     Lab Results   Component Value Date    CR 1.06 07/15/2021     Lab Results   Component Value Date     07/15/2021     07/15/2021       ASSESSMENT/PLAN: 89 yo F with history of full thickness rectal prolapse, now s/p perineal rectosigmoidectomy. AVSS.    1. Low fiber diet  2. PRN pain meds  3. Decrease IVF  4. Remove Thompson  5. OOB, ambulate  6. Appreciate hospitalist recommendation for abx for UTI  7. Heparin for ppx    Discussed with Dr. Andino.     For questions/paging, please contact the CRS office at [phone].    Opal Barger PA-C ([place])  Colorectal Physician Assistant    [facility]  [address]  [place]  [place] [address]  T: [phone]  F: [phone]      [facility] Staff  I performed a history and physical examination of the patient and discussed their management with the physician assistant. I reviewed the physician assistants note and agree with the documented findings and plan of care.     Ashley Andino MD    [facility]  [address]  [place] [address]  T: [phone]  F: [phone]

## 2021-07-15 NOTE — PROGRESS NOTES
VS WNL. Tele sinus rhythm with PVCs. A/Ox4 but is either confused about medications or mis-remembering medications; continue to assess for confusion or forgetfulness. Dressing CDI; changed at 0600. Pain controlled with acetaminophen. Up at bedside and dangled. Diet FLD. BS hypoactive, no flatus. Voiding adequately. LS CTA. IS to 1250.

## 2021-07-16 VITALS
SYSTOLIC BLOOD PRESSURE: 171 MMHG | WEIGHT: 118 LBS | BODY MASS INDEX: 24.77 KG/M2 | HEART RATE: 59 BPM | RESPIRATION RATE: 16 BRPM | TEMPERATURE: 97.8 F | HEIGHT: 58 IN | OXYGEN SATURATION: 97 % | DIASTOLIC BLOOD PRESSURE: 70 MMHG

## 2021-07-16 LAB — GLUCOSE BLDC GLUCOMTR-MCNC: 184 MG/DL (ref 70–99)

## 2021-07-16 PROCEDURE — 250N000013 HC RX MED GY IP 250 OP 250 PS 637: Performed by: PHYSICIAN ASSISTANT

## 2021-07-16 PROCEDURE — 250N000011 HC RX IP 250 OP 636: Performed by: COLON & RECTAL SURGERY

## 2021-07-16 PROCEDURE — 250N000013 HC RX MED GY IP 250 OP 250 PS 637: Performed by: COLON & RECTAL SURGERY

## 2021-07-16 RX ADMIN — HEPARIN SODIUM 5000 UNITS: 5000 INJECTION, SOLUTION INTRAVENOUS; SUBCUTANEOUS at 06:09

## 2021-07-16 RX ADMIN — CEFADROXIL 500 MG: 500 CAPSULE ORAL at 09:01

## 2021-07-16 RX ADMIN — TIMOLOL MALEATE 1 DROP: 5 SOLUTION/ DROPS OPHTHALMIC at 09:00

## 2021-07-16 RX ADMIN — HYDROCHLOROTHIAZIDE 12.5 MG: 12.5 TABLET ORAL at 08:59

## 2021-07-16 RX ADMIN — INSULIN ASPART 1 UNITS: 100 INJECTION, SOLUTION INTRAVENOUS; SUBCUTANEOUS at 09:00

## 2021-07-16 RX ADMIN — LISINOPRIL 2.5 MG: 2.5 TABLET ORAL at 08:59

## 2021-07-16 RX ADMIN — ACETAMINOPHEN 975 MG: 325 TABLET, FILM COATED ORAL at 06:10

## 2021-07-16 ASSESSMENT — ACTIVITIES OF DAILY LIVING (ADL)
ADLS_ACUITY_SCORE: 20

## 2021-07-16 NOTE — PROGRESS NOTES
COLON & RECTAL SURGERY  PROGRESS NOTE    July 16, 2021  Post-op Day #2 perineal rectosigmoidectomy    SUBJECTIVE:  Completed abx for UTI yesterday. Tolerating diet. Ambulating. Urinating and passing flatus. No pain.     OBJECTIVE:  Temp:  [97.9  F (36.6  C)-98.8  F (37.1  C)] 98.8  F (37.1  C)  Pulse:  [55-75] 55  Resp:  [15-16] 16  BP: (121-157)/(47-63) 154/63  SpO2:  [96 %-99 %] 97 %    Intake/Output Summary (Last 24 hours) at 7/16/2021 0722  Last data filed at 7/15/2021 1706  Gross per 24 hour   Intake 1521.25 ml   Output --   Net 1521.25 ml       GENERAL:  Awake, alert, no acute distress  HEAD: Normocephalic atraumatic  SCLERA: Anicteric  EXTREMITIES: Warm and well perfused      LABS:  No results found for: WBC  No results found for: HGB  No results found for: HCT  Lab Results   Component Value Date     07/14/2021     Last Basic Metabolic Panel:  Lab Results   Component Value Date     07/15/2021      Lab Results   Component Value Date    POTASSIUM 4.0 07/15/2021     Lab Results   Component Value Date    CHLORIDE 105 07/15/2021     Lab Results   Component Value Date    JOSÉ ANTONIO 8.6 07/15/2021     Lab Results   Component Value Date    CO2 15 07/15/2021     Lab Results   Component Value Date    BUN 16 07/15/2021     Lab Results   Component Value Date    CR 1.06 07/15/2021     Lab Results   Component Value Date     07/16/2021     07/15/2021       ASSESSMENT/PLAN: 89 yo F with history of full thickness rectal prolapse, now s/p perineal rectosigmoidectomy. AVSS.     1. Low fiber diet  2. PRN pain meds  3. Discontinue IVF  4. Thompson out and voiding  5. OOB, ambulate  6. Heparin for ppx  7. Discharge this afternoon.     Discussed with Dr. Andino.     For questions/paging, please contact the CRS office at [phone].    Opal Barger ([place]), PAEdilbertoC  Colorectal Physician Assistant    [facility]  [address] [facility]  [place] [address]  T: [phone]  F: [phone]

## 2021-07-16 NOTE — PROGRESS NOTES
Discharge    Patient discharged to home via care of sisters with all belongings and instructions on medications.   Care plan note completed.    Listed belongings gathered and returned to patient. Yes  Belongings returned to patient from security/pharmacy Yes  Care Plan and Patient education resolved: Yes  Prescriptions if needed, hard copies sent with patient  NA  Home and hospital acquired medications returned to patient: Yes  Medication Bin checked and emptied on discharge Yes  Follow up appointment made for patient: Yes

## 2021-07-16 NOTE — PROGRESS NOTES
VS WNL though slightly cameron while sleeping. Tele NSR with very occasional PACs (change from PVCs last night). A/Ox4. Incisions CDI, scant drainage. Pain controlled with Tylenol. Up with s/b assist. Diet low fiber. BS active, passing flatus. Voiding adequately, though frequently. LS CTA. IS to 1200.

## 2021-07-19 NOTE — DISCHARGE SUMMARY
[facility] Discharge Summary      Zoe House MRN# [id number]   Age: 88 year old YOB: 1933     Date of Admission:  7/14/2021  Date of Discharge::  7/16/2021 10:45 AM  Admitting Physician:  Coco Andino MD  Discharge Physician:  Coco Andino MD     PCP:  [facility]    Disposition: Patient discharged from [facility] to home in stable condition.        Primary Diagnosis:   Full thickness rectal prolapse            Discharge Medications:     Discharge Medication List as of 7/16/2021 10:28 AM      CONTINUE these medications which have NOT CHANGED    Details   ALPRAZolam (XANAX) 0.25 MG tablet Take 0.25 mg by mouth, Historical      aspirin (ASA) 325 MG tablet Historical      glipiZIDE (GLUCOTROL) 5 MG tablet Take 2.5 mg by mouth every morning (0.5 X 5 mg), Historical      hydrochlorothiazide (HYDRODIURIL) 25 MG tablet Take 12.5 mg by mouth daily (0.5 X 25 mg), Historical      latanoprost (XALATAN) 0.005 % ophthalmic solution Historical      lisinopril (ZESTRIL) 2.5 MG tablet Take 2.5 mg by mouth, Historical      metFORMIN (GLUCOPHAGE-XR) 500 MG 24 hr tablet Take 1,000 mg by mouth 2 times daily (2 X 500 mg), Historical      rosuvastatin (CRESTOR) 10 MG tablet Take 10 mg by mouth, Historical      timolol maleate (TIMOPTIC) 0.5 % ophthalmic solution Place 1 drop into both eyes 2 times daily , Historical         STOP taking these medications       metroNIDAZOLE (FLAGYL) 500 MG tablet Comments:   Reason for Stopping:         neomycin (MYCIFRADIN) 500 MG tablet Comments:   Reason for Stopping:         ondansetron (ZOFRAN) 4 MG tablet Comments:   Reason for Stopping:                      Follow Up, Special Instructions:     Discharge diet: Regular   Discharge activity: Lifting restricted to 15 pounds   Discharge follow-up: Follow up with Dr. Andino in 3-4 weeks   Wound care: May get incision wet in shower but do not soak or scrub               Procedures:     Procedure(s): Perineal rectosigmoidectomy       No other procedures performed during this admission            Consultations:   hospitalist          Brief Hospital Summary:     Patient is a 88 year old female who underwent perineal rectosigmoidectomy on 7/14/21 by Dr. Andino.   There were no immediate complications during this procedure.    Please refer to the full operative summary for details.  The patient's hospital course was unremarkable.  Pain was controlled on oral pain regimen.  She was tolerating a low fiber diet.  Bowel function had returned prior to discharge.  She recovered as anticipated and experienced no post-operative complications.         Attestation:  I have reviewed today's vital signs, notes, medications, labs and imaging.    Opal Barger ([place]), PAEdilbertoC  Colorectal Physician Assistant    [facility]  [address] [address]  T: [phone]  F: [phone]

## 2021-07-22 LAB
ATRIAL RATE - MUSE: 52 BPM
DIASTOLIC BLOOD PRESSURE - MUSE: NORMAL MMHG
INTERPRETATION ECG - MUSE: NORMAL
P AXIS - MUSE: 47 DEGREES
PR INTERVAL - MUSE: 168 MS
QRS DURATION - MUSE: 128 MS
QT - MUSE: 522 MS
QTC - MUSE: 485 MS
R AXIS - MUSE: -21 DEGREES
SYSTOLIC BLOOD PRESSURE - MUSE: NORMAL MMHG
T AXIS - MUSE: 57 DEGREES
VENTRICULAR RATE- MUSE: 52 BPM

## 2023-07-20 ENCOUNTER — OFFICE VISIT (OUTPATIENT)
Dept: URGENT CARE | Facility: URGENT CARE | Age: 88
End: 2023-07-20
Payer: MEDICARE

## 2023-07-20 VITALS
HEART RATE: 63 BPM | DIASTOLIC BLOOD PRESSURE: 63 MMHG | SYSTOLIC BLOOD PRESSURE: 162 MMHG | OXYGEN SATURATION: 97 % | RESPIRATION RATE: 16 BRPM | WEIGHT: 122 LBS | BODY MASS INDEX: 25.5 KG/M2 | TEMPERATURE: 97.4 F

## 2023-07-20 DIAGNOSIS — S09.90XA INJURY OF HEAD, INITIAL ENCOUNTER: Primary | ICD-10-CM

## 2023-07-20 PROCEDURE — 99203 OFFICE O/P NEW LOW 30 MIN: CPT | Performed by: PHYSICIAN ASSISTANT

## 2023-07-20 ASSESSMENT — ENCOUNTER SYMPTOMS: WOUND: 1

## 2023-07-20 NOTE — PROGRESS NOTES
SUBJECTIVE:   Zoe House is a 89 year old female presenting with a chief complaint of   Chief Complaint   Patient presents with     Urgent Care     Fall     Per patient fell about 45 mins ago hitting her head and getting a laceration, bleeding has stopped per daughter patient has been a little off balance           She is a new patient of [facility].  Patient presents with laceration to scalp after falling into a desk.  Patient was trying to move a recliner and the foot rest released and pushed her into a desk.  No LOC.  No n/v.  Occurred an hour ago.         Review of Systems   Skin: Positive for wound.   All other systems reviewed and are negative.      Past Medical History:   Diagnosis Date     Chronic right shoulder pain      Diabetes (H)      High cholesterol      Hypertension      Rectal prolapse      Rotator cuff syndrome, right      Family History   Problem Relation Age of Onset     Cerebrovascular Disease Mother      Cerebrovascular Disease Brother      Colon Cancer No family hx of      Current Outpatient Medications   Medication Sig Dispense Refill     aspirin (ASA) 325 MG tablet        hydrochlorothiazide (HYDRODIURIL) 25 MG tablet Take 12.5 mg by mouth daily (0.5 X 25 mg)       latanoprost (XALATAN) 0.005 % ophthalmic solution        lisinopril (ZESTRIL) 2.5 MG tablet Take 2.5 mg by mouth       metFORMIN (GLUCOPHAGE-XR) 500 MG 24 hr tablet Take 1,000 mg by mouth 2 times daily (2 X 500 mg)       rosuvastatin (CRESTOR) 10 MG tablet Take 10 mg by mouth       timolol maleate (TIMOPTIC) 0.5 % ophthalmic solution Place 1 drop into both eyes 2 times daily        ALPRAZolam (XANAX) 0.25 MG tablet Take 0.25 mg by mouth (Patient not taking: Reported on 7/20/2023)       glipiZIDE (GLUCOTROL) 5 MG tablet Take 2.5 mg by mouth every morning (0.5 X 5 mg) (Patient not taking: Reported on 7/20/2023)       Social History     Tobacco Use     Smoking status: Never     Smokeless tobacco: Never   Substance Use Topics      Alcohol use: Never       OBJECTIVE  BP (!) 162/63   Pulse 63   Temp 97.4  F (36.3  C) (Tympanic)   Resp 16   Wt 55.3 kg (122 lb)   SpO2 97%   BMI 25.50 kg/m      Physical Exam  Vitals reviewed.   Constitutional:       Appearance: Normal appearance. She is normal weight.   Eyes:      Extraocular Movements: Extraocular movements intact.      Conjunctiva/sclera: Conjunctivae normal.   Cardiovascular:      Rate and Rhythm: Normal rate.   Skin:     Comments: Scalp :  Right top side of scalp with a 3 cm hematoma and a 5 mm laceration.  Not currently bleeding.     Neurological:      Mental Status: She is alert.         Labs:  No results found for this or any previous visit (from the past 24 hour(s)).      ASSESSMENT:      ICD-10-CM    1. Injury of head, initial encounter  S09.90XA            Medical Decision Making:    Differential Diagnosis:  Scalp laceration    Serious Comorbid Conditions:  Adult:  reviewed    PLAN:  Discussed that was a small wound and can be treated with or without a staple.  Patient declined any staples.      Laceration:    Keep wound clean and dry for the next 24-48 hours  Ok to shower and get wound wet after 48 hours, but do not soak for 2 weeks  Wound follow-up: prn  May return to work/school as long as wound is kept clean and dry  Discussed the probability of scarring  Active range of motion exercises encouraged for finger lacerations    Patient will return immediately if they experience redness around the laceration, drainage, worsening pain, or fever.  Discussed reasons to seek immediate medical attention.    Tylenol prn.        Followup:    If not improving or if condition worsens, follow up with your Primary Care Provider, If not improving or if conditions worsens over the next 12-24 hours, go to the Emergency Department    There are no Patient Instructions on file for this visit.

## 2024-12-02 ENCOUNTER — OFFICE VISIT (OUTPATIENT)
Dept: URGENT CARE | Facility: URGENT CARE | Age: 89
End: 2024-12-02
Payer: MEDICARE

## 2024-12-02 VITALS
RESPIRATION RATE: 16 BRPM | WEIGHT: 112 LBS | DIASTOLIC BLOOD PRESSURE: 89 MMHG | HEART RATE: 54 BPM | TEMPERATURE: 97.8 F | BODY MASS INDEX: 23.41 KG/M2 | SYSTOLIC BLOOD PRESSURE: 177 MMHG | OXYGEN SATURATION: 96 %

## 2024-12-02 DIAGNOSIS — R03.0 ELEVATED BLOOD PRESSURE READING: Primary | ICD-10-CM

## 2024-12-02 ASSESSMENT — ENCOUNTER SYMPTOMS
SHORTNESS OF BREATH: 0
WEAKNESS: 1
FATIGUE: 1
HEADACHES: 0

## 2024-12-02 ASSESSMENT — PAIN SCALES - GENERAL: PAINLEVEL_OUTOF10: NO PAIN (0)

## 2024-12-02 NOTE — PROGRESS NOTES
SUBJECTIVE:   Zoe House is a 89 year old female presenting with a chief complaint of   Chief Complaint   Patient presents with    Urgent Care    Blood Pressure Check     High blood pressure, no dizzyness or headaches         She is an established patient of [facility].  Patient presents with elevated blood pressure.  States her grandson is in nursing school and has him regularly check her pressure and was found to be elevated.  She then called nurse triage and was recommended to go to .  States has prolapsed rectum and walks slow.  Has had balance problems from time to time and is not new.  Very little bleeding.  Took lisinopril this morning and does not take any hydrochlorothiazide for  years.          Review of Systems   Constitutional:  Positive for fatigue.   Respiratory:  Negative for shortness of breath.    Cardiovascular:  Negative for chest pain.   Neurological:  Positive for weakness. Negative for headaches.   All other systems reviewed and are negative.      Past Medical History:   Diagnosis Date    Chronic right shoulder pain     Diabetes (H)     High cholesterol     Hypertension     Rectal prolapse     Rotator cuff syndrome, right      Family History   Problem Relation Age of Onset    Cerebrovascular Disease Mother     Cerebrovascular Disease Brother     Colon Cancer No family hx of      Current Outpatient Medications   Medication Sig Dispense Refill    aspirin (ASA) 325 MG tablet       hydrochlorothiazide (HYDRODIURIL) 25 MG tablet Take 12.5 mg by mouth daily (0.5 X 25 mg)      latanoprost (XALATAN) 0.005 % ophthalmic solution       lisinopril (ZESTRIL) 2.5 MG tablet Take 2.5 mg by mouth      metFORMIN (GLUCOPHAGE-XR) 500 MG 24 hr tablet Take 1,000 mg by mouth 2 times daily (2 X 500 mg)      rosuvastatin (CRESTOR) 10 MG tablet Take 10 mg by mouth      timolol maleate (TIMOPTIC) 0.5 % ophthalmic solution Place 1 drop into both eyes 2 times daily       ALPRAZolam (XANAX) 0.25 MG tablet Take 0.25 mg  by mouth (Patient not taking: Reported on 7/20/2023)      glipiZIDE (GLUCOTROL) 5 MG tablet Take 2.5 mg by mouth every morning (0.5 X 5 mg) (Patient not taking: Reported on 7/20/2023)       Social History     Tobacco Use    Smoking status: Never    Smokeless tobacco: Never   Substance Use Topics    Alcohol use: Never       OBJECTIVE  BP (!) 177/89   Pulse 54   Temp 97.8  F (36.6  C) (Tympanic)   Resp 16   Wt 50.8 kg (112 lb)   SpO2 96%   BMI 23.41 kg/m      Physical Exam  Vitals and nursing note reviewed.   Constitutional:       General: She is not in acute distress.     Appearance: Normal appearance. She is normal weight. She is not ill-appearing, toxic-appearing or diaphoretic.   Eyes:      Extraocular Movements: Extraocular movements intact.      Conjunctiva/sclera: Conjunctivae normal.   Cardiovascular:      Rate and Rhythm: Normal rate and regular rhythm.      Pulses: Normal pulses.      Heart sounds: Normal heart sounds.   Pulmonary:      Effort: Pulmonary effort is normal.      Breath sounds: Normal breath sounds.   Skin:     General: Skin is warm and dry.   Neurological:      General: No focal deficit present.      Mental Status: She is alert.   Psychiatric:         Mood and Affect: Mood normal.         Behavior: Behavior normal.         Labs:  No results found for this or any previous visit (from the past 24 hours).    EKG:  NSR, hr 57    ASSESSMENT:      ICD-10-CM    1. Elevated blood pressure reading  R03.0 EKG 12-lead complete w/read - Clinics           Medical Decision Making:    Differential Diagnosis:  Elevated blood pressure    Serious Comorbid Conditions:  Adult:   reviewed    PLAN:    Patient is on 40 mg of lisinopril daily.  I am hesitant to add another blood pressure medication.  Patient is without symptoms so I've asked her to contact her PCP for direction.  We did discuss specific symptoms to go to ED for.   Follow up with PCP.  Keep blood pressure diary to bring.  Discussed reasons to  seek immediate medical attention.  Additionally if no improvement or worsening in one week, may follow up with PCP and/or UC.        Followup:    If not improving or if condition worsens, follow up with your Primary Care Provider, If not improving or if conditions worsens over the next 12-24 hours, go to the Emergency Department    There are no Patient Instructions on file for this visit.

## (undated) DEVICE — SUCTION TIP YANKAUER W/O VENT K86

## (undated) DEVICE — LINEN TOWEL PACK X5 5464

## (undated) DEVICE — ENDO FORCEP ENDOJAW BIOPSY 2.8MMX230CM FB-220U

## (undated) DEVICE — GLOVE PROTEXIS BLUE W/NEU-THERA 6.5  2D73EB65

## (undated) DEVICE — SUCTION CANISTER MEDIVAC LINER 3000ML W/LID 65651-530

## (undated) DEVICE — TUBING SUCTION 12"X1/4" N612

## (undated) DEVICE — SU VICRYL 2-0 REEL 54" J206G

## (undated) DEVICE — DRAPE MINOR PROCEDURE LAP 29496

## (undated) DEVICE — GLOVE PROTEXIS MICRO 6.5  2D73PM65

## (undated) DEVICE — CATH FOLEY 16FR 5ML SILICONE LUBRI-SIL 175816

## (undated) DEVICE — SYR 10ML LL W/O NDL 302995

## (undated) DEVICE — KIT ENDO TURNOVER/PROCEDURE W/CLEAN A SCOPE LINERS 103888

## (undated) DEVICE — SU VICRYL 3-0 SH 27" J316H

## (undated) DEVICE — SU VICRYL 3-0 SH 8X18" UND J864D

## (undated) DEVICE — ESU PENCIL W/SMOKE EVAC CVPLP2000

## (undated) DEVICE — ENDO SNARE POLYPECTOMY OVAL 15MM LOOP SD-240U-15

## (undated) DEVICE — SU VICRYL 2-0 SH 27" J317H

## (undated) DEVICE — NDL COUNTER 40CT  31142311

## (undated) DEVICE — NDL 19GA 1.5"

## (undated) DEVICE — ENDO TRAP POLYP QUICK CATCH 710201

## (undated) DEVICE — SPONGE BALL KERLIX ROUND XL W/O STRING LATEX 4935

## (undated) DEVICE — GOWN LG DISP 9515

## (undated) DEVICE — CLIP HEMOCLIP ENDOSCOPIC INSTINCT 2.8X230CM INSC-7-230-SS

## (undated) DEVICE — PACK MINOR SBA15MIFSE

## (undated) DEVICE — ESU LIGASURE OPEN SEALER/DIVIDER SM JAW 16.5MM LF1212A

## (undated) DEVICE — ESU ELEC NDL 1" E1552

## (undated) DEVICE — SOL WATER IRRIG 1000ML BOTTLE 2F7114

## (undated) DEVICE — DRSG TEGADERM 4X4 3/4" 1626

## (undated) DEVICE — RETR RING LONE STAR 14.1X14.1CM 3307G

## (undated) RX ORDER — PROPOFOL 10 MG/ML
INJECTION, EMULSION INTRAVENOUS
Status: DISPENSED
Start: 2021-07-14

## (undated) RX ORDER — HEPARIN SODIUM 5000 [USP'U]/.5ML
INJECTION, SOLUTION INTRAVENOUS; SUBCUTANEOUS
Status: DISPENSED
Start: 2021-07-14

## (undated) RX ORDER — DEXAMETHASONE SODIUM PHOSPHATE 4 MG/ML
INJECTION, SOLUTION INTRA-ARTICULAR; INTRALESIONAL; INTRAMUSCULAR; INTRAVENOUS; SOFT TISSUE
Status: DISPENSED
Start: 2021-07-14

## (undated) RX ORDER — CEFAZOLIN SODIUM 2 G/100ML
INJECTION, SOLUTION INTRAVENOUS
Status: DISPENSED
Start: 2021-07-14

## (undated) RX ORDER — GLYCOPYRROLATE 0.2 MG/ML
INJECTION, SOLUTION INTRAMUSCULAR; INTRAVENOUS
Status: DISPENSED
Start: 2021-07-14

## (undated) RX ORDER — ONDANSETRON 2 MG/ML
INJECTION INTRAMUSCULAR; INTRAVENOUS
Status: DISPENSED
Start: 2021-07-14

## (undated) RX ORDER — SIMETHICONE 40MG/0.6ML
SUSPENSION, DROPS(FINAL DOSAGE FORM)(ML) ORAL
Status: DISPENSED
Start: 2021-07-13

## (undated) RX ORDER — FENTANYL CITRATE 50 UG/ML
INJECTION, SOLUTION INTRAMUSCULAR; INTRAVENOUS
Status: DISPENSED
Start: 2021-07-13

## (undated) RX ORDER — FENTANYL CITRATE 50 UG/ML
INJECTION, SOLUTION INTRAMUSCULAR; INTRAVENOUS
Status: DISPENSED
Start: 2021-07-14

## (undated) RX ORDER — LIDOCAINE HYDROCHLORIDE 20 MG/ML
INJECTION, SOLUTION EPIDURAL; INFILTRATION; INTRACAUDAL; PERINEURAL
Status: DISPENSED
Start: 2021-07-14

## (undated) RX ORDER — ACETAMINOPHEN 325 MG/1
TABLET ORAL
Status: DISPENSED
Start: 2021-07-14

## (undated) RX ORDER — NEOSTIGMINE METHYLSULFATE 1 MG/ML
VIAL (ML) INJECTION
Status: DISPENSED
Start: 2021-07-14